# Patient Record
Sex: FEMALE | Race: BLACK OR AFRICAN AMERICAN | Employment: OTHER | URBAN - METROPOLITAN AREA
[De-identification: names, ages, dates, MRNs, and addresses within clinical notes are randomized per-mention and may not be internally consistent; named-entity substitution may affect disease eponyms.]

---

## 2024-05-29 ENCOUNTER — HOSPITAL ENCOUNTER (EMERGENCY)
Facility: HOSPITAL | Age: 24
Discharge: PSYCHIATRIC OR SUBSTANCE USE FACILITY | End: 2024-05-29
Attending: EMERGENCY MEDICINE
Payer: OTHER GOVERNMENT

## 2024-05-29 ENCOUNTER — HOSPITAL ENCOUNTER (INPATIENT)
Facility: HOSPITAL | Age: 24
LOS: 1 days | Discharge: HOME | End: 2024-05-30
Attending: PSYCHIATRY & NEUROLOGY | Admitting: PSYCHIATRY & NEUROLOGY
Payer: OTHER GOVERNMENT

## 2024-05-29 VITALS
HEART RATE: 89 BPM | WEIGHT: 145 LBS | TEMPERATURE: 98.3 F | DIASTOLIC BLOOD PRESSURE: 95 MMHG | OXYGEN SATURATION: 98 % | RESPIRATION RATE: 18 BRPM | BODY MASS INDEX: 21.48 KG/M2 | HEIGHT: 69 IN | SYSTOLIC BLOOD PRESSURE: 138 MMHG

## 2024-05-29 DIAGNOSIS — F32.9 CURRENT EPISODE OF MAJOR DEPRESSIVE DISORDER WITHOUT PRIOR EPISODE, UNSPECIFIED DEPRESSION EPISODE SEVERITY: Primary | ICD-10-CM

## 2024-05-29 DIAGNOSIS — R45.851 SUICIDAL IDEATION: ICD-10-CM

## 2024-05-29 LAB
ALBUMIN SERPL-MCNC: 3.5 G/DL (ref 3.5–5)
ALBUMIN/GLOB SERPL: 1 (ref 1.1–2.2)
ALP SERPL-CCNC: 46 U/L (ref 45–117)
ALT SERPL-CCNC: 21 U/L (ref 12–78)
AMPHET UR QL SCN: NEGATIVE
ANION GAP SERPL CALC-SCNC: 8 MMOL/L (ref 5–15)
APPEARANCE UR: CLEAR
AST SERPL W P-5'-P-CCNC: 14 U/L (ref 15–37)
BACTERIA URNS QL MICRO: ABNORMAL /HPF
BARBITURATES UR QL SCN: NEGATIVE
BASOPHILS # BLD: 0 K/UL (ref 0–0.1)
BASOPHILS NFR BLD: 0 % (ref 0–1)
BENZODIAZ UR QL: NEGATIVE
BILIRUB SERPL-MCNC: 1.7 MG/DL (ref 0.2–1)
BILIRUB UR QL: NEGATIVE
BUN SERPL-MCNC: 16 MG/DL (ref 6–20)
BUN/CREAT SERPL: 21 (ref 12–20)
BUPRENORPHINE UR QL: NEGATIVE
CA-I BLD-MCNC: 8.7 MG/DL (ref 8.5–10.1)
CANNABINOIDS UR QL SCN: NEGATIVE
CHLORIDE SERPL-SCNC: 105 MMOL/L (ref 97–108)
CO2 SERPL-SCNC: 29 MMOL/L (ref 21–32)
COCAINE UR QL SCN: NEGATIVE
COLOR UR: YELLOW
CREAT SERPL-MCNC: 0.77 MG/DL (ref 0.55–1.02)
DIFFERENTIAL METHOD BLD: ABNORMAL
EOSINOPHIL # BLD: 0.1 K/UL (ref 0–0.4)
EOSINOPHIL NFR BLD: 3 % (ref 0–7)
EPITH CASTS URNS QL MICRO: ABNORMAL /LPF
ERYTHROCYTE [DISTWIDTH] IN BLOOD BY AUTOMATED COUNT: 12.8 % (ref 11.5–14.5)
ETHANOL SERPL-MCNC: <10 MG/DL (ref 0–0.08)
GLOBULIN SER CALC-MCNC: 3.6 G/DL (ref 2–4)
GLUCOSE SERPL-MCNC: 91 MG/DL (ref 65–100)
GLUCOSE UR STRIP.AUTO-MCNC: NEGATIVE MG/DL
HCG UR QL: NEGATIVE
HCT VFR BLD AUTO: 33.1 % (ref 35–47)
HGB BLD-MCNC: 10.9 G/DL (ref 11.5–16)
HGB UR QL STRIP: ABNORMAL
IMM GRANULOCYTES # BLD AUTO: 0 K/UL (ref 0–0.04)
IMM GRANULOCYTES NFR BLD AUTO: 0 % (ref 0–0.5)
KETONES UR QL STRIP.AUTO: 50 MG/DL
LEUKOCYTE ESTERASE UR QL STRIP.AUTO: NEGATIVE
LYMPHOCYTES # BLD: 1.3 K/UL (ref 0.8–3.5)
LYMPHOCYTES NFR BLD: 25 % (ref 12–49)
Lab: NORMAL
MCH RBC QN AUTO: 31.1 PG (ref 26–34)
MCHC RBC AUTO-ENTMCNC: 32.9 G/DL (ref 30–36.5)
MCV RBC AUTO: 94.3 FL (ref 80–99)
METHADONE UR QL: NEGATIVE
METHAMPHET UR QL: NEGATIVE
MONOCYTES # BLD: 0.4 K/UL (ref 0–1)
MONOCYTES NFR BLD: 9 % (ref 5–13)
NEUTS SEG # BLD: 3.3 K/UL (ref 1.8–8)
NEUTS SEG NFR BLD: 63 % (ref 32–75)
NITRITE UR QL STRIP.AUTO: NEGATIVE
OPIATES UR QL: NEGATIVE
OXYCODONE UR QL SCN: NEGATIVE
PCP UR QL: NEGATIVE
PH UR STRIP: 6.5 (ref 5–8)
PLATELET # BLD AUTO: 200 K/UL (ref 150–400)
PMV BLD AUTO: 11.1 FL (ref 8.9–12.9)
POTASSIUM SERPL-SCNC: 3.4 MMOL/L (ref 3.5–5.1)
PROT SERPL-MCNC: 7.1 G/DL (ref 6.4–8.2)
PROT UR STRIP-MCNC: 30 MG/DL
RBC # BLD AUTO: 3.51 M/UL (ref 3.8–5.2)
RBC #/AREA URNS HPF: ABNORMAL /HPF (ref 0–5)
SODIUM SERPL-SCNC: 142 MMOL/L (ref 136–145)
SP GR UR REFRACTOMETRY: 1.02 (ref 1–1.03)
TRICYCLICS UR QL: NEGATIVE
UROBILINOGEN UR QL STRIP.AUTO: 4 EU/DL (ref 0.2–1)
WBC # BLD AUTO: 5.2 K/UL (ref 3.6–11)
WBC URNS QL MICRO: ABNORMAL /HPF (ref 0–4)

## 2024-05-29 PROCEDURE — 85025 COMPLETE CBC W/AUTO DIFF WBC: CPT

## 2024-05-29 PROCEDURE — 81001 URINALYSIS AUTO W/SCOPE: CPT

## 2024-05-29 PROCEDURE — 80307 DRUG TEST PRSMV CHEM ANLYZR: CPT

## 2024-05-29 PROCEDURE — 82077 ASSAY SPEC XCP UR&BREATH IA: CPT

## 2024-05-29 PROCEDURE — 1240000000 HC EMOTIONAL WELLNESS R&B

## 2024-05-29 PROCEDURE — 99283 EMERGENCY DEPT VISIT LOW MDM: CPT

## 2024-05-29 PROCEDURE — 80053 COMPREHEN METABOLIC PANEL: CPT

## 2024-05-29 PROCEDURE — 6370000000 HC RX 637 (ALT 250 FOR IP): Performed by: EMERGENCY MEDICINE

## 2024-05-29 PROCEDURE — 6370000000 HC RX 637 (ALT 250 FOR IP): Performed by: PSYCHIATRY & NEUROLOGY

## 2024-05-29 PROCEDURE — 81025 URINE PREGNANCY TEST: CPT

## 2024-05-29 RX ORDER — MAGNESIUM HYDROXIDE/ALUMINUM HYDROXICE/SIMETHICONE 120; 1200; 1200 MG/30ML; MG/30ML; MG/30ML
30 SUSPENSION ORAL EVERY 6 HOURS PRN
Status: DISCONTINUED | OUTPATIENT
Start: 2024-05-29 | End: 2024-05-30 | Stop reason: HOSPADM

## 2024-05-29 RX ORDER — TRAZODONE HYDROCHLORIDE 50 MG/1
50 TABLET ORAL NIGHTLY PRN
Status: DISCONTINUED | OUTPATIENT
Start: 2024-05-29 | End: 2024-05-30 | Stop reason: HOSPADM

## 2024-05-29 RX ORDER — ACETAMINOPHEN 325 MG/1
650 TABLET ORAL EVERY 4 HOURS PRN
Status: DISCONTINUED | OUTPATIENT
Start: 2024-05-29 | End: 2024-05-30 | Stop reason: HOSPADM

## 2024-05-29 RX ORDER — HYDROXYZINE 50 MG/1
50 TABLET, FILM COATED ORAL EVERY 6 HOURS PRN
Status: DISCONTINUED | OUTPATIENT
Start: 2024-05-29 | End: 2024-05-30 | Stop reason: HOSPADM

## 2024-05-29 RX ORDER — POTASSIUM CHLORIDE 20 MEQ/1
40 TABLET, EXTENDED RELEASE ORAL ONCE
Status: COMPLETED | OUTPATIENT
Start: 2024-05-29 | End: 2024-05-29

## 2024-05-29 RX ORDER — SERTRALINE HYDROCHLORIDE 25 MG/1
25 TABLET, FILM COATED ORAL DAILY
Status: DISCONTINUED | OUTPATIENT
Start: 2024-05-29 | End: 2024-05-30 | Stop reason: HOSPADM

## 2024-05-29 RX ADMIN — SERTRALINE HYDROCHLORIDE 25 MG: 25 TABLET ORAL at 11:55

## 2024-05-29 RX ADMIN — POTASSIUM CHLORIDE 40 MEQ: 1500 TABLET, EXTENDED RELEASE ORAL at 05:06

## 2024-05-29 ASSESSMENT — LIFESTYLE VARIABLES
HOW OFTEN DO YOU HAVE A DRINK CONTAINING ALCOHOL: MONTHLY OR LESS
HOW MANY STANDARD DRINKS CONTAINING ALCOHOL DO YOU HAVE ON A TYPICAL DAY: 1 OR 2
HOW MANY STANDARD DRINKS CONTAINING ALCOHOL DO YOU HAVE ON A TYPICAL DAY: 1 OR 2
HOW OFTEN DO YOU HAVE A DRINK CONTAINING ALCOHOL: MONTHLY OR LESS

## 2024-05-29 ASSESSMENT — PAIN SCALES - GENERAL: PAINLEVEL_OUTOF10: 0

## 2024-05-29 ASSESSMENT — PAIN - FUNCTIONAL ASSESSMENT: PAIN_FUNCTIONAL_ASSESSMENT: 0-10

## 2024-05-29 ASSESSMENT — SLEEP AND FATIGUE QUESTIONNAIRES
AVERAGE NUMBER OF SLEEP HOURS: 6
DO YOU HAVE DIFFICULTY SLEEPING: YES
DO YOU USE A SLEEP AID: NO
SLEEP PATTERN: DISTURBED/INTERRUPTED SLEEP

## 2024-05-29 NOTE — BSMART NOTE
Patient accepted by Dr. Penn at [facility]. Patient will go to bed 244-2 and report should be called to [phone]. Patient's nurse, Misti, notified.   
suicide attempts. Patient reports alcohol use about once a month and states that she last drank 3 days ago. Patient denies all other substance use.     Patient states that she is single with no children. [place] [facility]. Patient states that she is permanent party in the . She states that she has been at [facility] since September 2023.     At this time, patient would like to be voluntarily admitted to [facility].       This writer reviewed the [place] Suicide Severity Rating Scale in nursing flowsheet and the risk level assigned is low risk.  Based on this assessment, the risk of suicide is low risk and the plan is voluntary [facility] admission.  BSMART assessment completed, and suicide risk level noted to be low. NADINE Chappell notified. Concerns not observed.    The patient has demonstrated mental capacity to provide informed consent.  The information is given by the patient.  Previous Hospitalizations: None  The patient has not previously been in restraints.  Current Psychiatrist and/or  is none.    Lethality Assessment:  The potential for suicide noted by the following: noted by the following;  ideation.  The potential for homicide is not noted.  The patient has not been a perpetrator of sexual or physical abuse.  There are not pending charges.  The patient is not felt to be at risk for self harm or harm to others.       Section III - Psychosocial  The patient's overall mood and attitude is depressed/withdrawn.  Feelings of helplessness and hopelessness are not observed.  Generalized anxiety is not observed.  Panic is not observed. Phobias are not observed.  Obsessive compulsive tendencies are not observed.        Section IV - Mental Status Exam  The patient's appearance shows no evidence of impairment.  The patient's behavior shows no evidence of impairment. The patient is oriented to time, place, person and situation.  The patient's speech is soft.  The

## 2024-05-29 NOTE — GROUP NOTE
Group Therapy Note    Date: 5/29/2024    Group Start Time: 1410  Group End Time: 1440  Group Topic: Nursing    SSR 2 [facility] AC[facility]    Zeke Johnson RN    Discharge process    Group Therapy Note    Attendees: 2/8       Patient's Goal:      Notes:  Encouraged to attend did not    Status After Intervention:      Participation Level:     Participation Quality:       Speech:        Thought Process/Content:       Affective Functioning:       Mood:       Level of consciousness:        Response to Learning:       Endings:     Modes of Intervention:       Discipline Responsible: Registered Nurse      Signature:  Zeke Johnson RN

## 2024-05-29 NOTE — GROUP NOTE
Group Therapy Note    Date: 2024    Group Start Time: 930  Group End Time: 950  Group Topic: Nursing    SSR 2 [facility] ACUTE    Evelyn Cannon LPN        Group Therapy Note    Attendees: 1         Patient's Goal:  ***    Notes:  ***    Status After Intervention:  {Status After Intervention:718483159}    Participation Level: {Participation Level:068998204}    Participation Quality: {[facility] PARTICIPATION QUALITY:852223141}      Speech:  {[facility] CD_SPEECH:38028}      Thought Process/Content: {Thought Process/Content:482278439}      Affective Functioning: {Affective Functionin}      Mood: {Mood:242665904}      Level of consciousness:  {Level of consciousness:739187030}      Response to Learning: {[facility] Responses to Learnin}      Endings: {[facility] Endings:01293}    Modes of Intervention: {[facility] Modes of Intervention:031896525}      Discipline Responsible: {[facility] Multidisciplinary:839369887}      Signature:  Evelyn Cannon LPN

## 2024-05-29 NOTE — ED PROVIDER NOTES
comments: Patient is a 23-year-old female who presents with a chief complaint of mental health concerns.  Describes sensation of not wanting to be here which she says has been developing over the past few weeks.  She denies any sort of plan and denies any prior actual attempts.    Differential includes major depressive disorder, psychotic disorder, schizophrenia, bipolar disorder, emotional crisis, malingering, generalized anxiety disorder, schizoaffective disorder.    Will initiate BSMART consult.           ED Course:         ED Course as of 05/29/24 0623   Wed May 29, 2024   0408 Per BSMART, pt will be admitted to [facility]; will order medical screening labs   [REKHA]   0450 Hemoglobin Quant(!): 10.9 [REKHA]   0451 Ketones, Urine(!): 50 [REKHA]   0511 Lab workup shows normal renal function suspect likely dehydration.  Elevated BUN/creatinine ratio however I do not see indication at this time for intravenous fluids, patient able to drink.  Patient is medically clear for behavioral health assessment. [REKHA]      ED Course User Index  [REKHA] Mamadou Wahl DO     Sepsis Reassessment: Sepsis reassessment not applicable  ------------------------------------------------------------------------------------------------------------  SCREENINGS             No data recorded          Consultations:     CONSULTS:  IP CONSULT TO BSMART    See the ED course section or MDM, if applicable for details on the content of consultations requested.    Procedures and Critical Care       Performed by: Mamadou Wahl DO    Procedures             CRITICAL CARE NOTE :  6:23 AM  CRITICAL CARE TIME: Does not meet criteria for critical care time, 0 Minutes.    Mamadou Wahl DO      Disposition       Disposition: Admit to Psychiatry    Patient is medically cleared for admission and/or transfer to the psychiatry unit/psychiatric facility to complete their mental health treatment.      Diagnosis     Clinical Impression:   1. Current episode of major depressive

## 2024-05-29 NOTE — CONSULTS
Hospitalist Consultation Note    NAME:  Michelle Sweeney   :   2000   MRN:   [id number]     ATTENDING: Rayne Penn MD  PCP:  No primary care provider on file.    Date/Time:  2024 5:38 PM      Recommendations/Plan:       Active Problems:    * No active hospital problems. *  Resolved Problems:    * No resolved hospital problems. *  History of asthma        Code Status:   DVT Prophylaxis:           Subjective:   REQUESTING PHYSICIAN:  REASON FOR CONSULT:      Michelle is a 23 y.o.   female who I was asked to see for inpatient psychiatric evaluation and medical evaluation denies any chest pain no shortness of breath no cough no chills has a history of asthma denies any history of smoking            Past Medical History:   Diagnosis Date    Anxiety     Asthma       No past surgical history on file.  Social History     Tobacco Use    Smoking status: Never    Smokeless tobacco: Never   Substance Use Topics    Alcohol use: Yes      No family history on file.    No Known Allergies   Prior to Admission medications    Not on File       REVIEW OF SYSTEMS:     Total of 12 systems reviewed as follows:   I am not able to complete the review of systems because:   The patient is intubated and sedated    The patient has altered mental status due to his acute medical problems    The patient has baseline aphasia from prior stroke(s)    The patient has baseline dementia and is not reliable historian                 POSITIVE= underlined text  Negative = text not underlined  General:  fever, chills, sweats, generalized weakness, weight loss/gain,      loss of appetite   Eyes:    blurred vision, eye pain, loss of vision, double vision  ENT:    rhinorrhea, pharyngitis   Respiratory:   cough, sputum production, SOB, wheezing, SINGH, pleuritic pain   Cardiology:   chest pain, palpitations, orthopnea, PND, edema, syncope   Gastrointestinal:  abdominal pain , N/V, dysphagia, diarrhea, constipation, bleeding  answered       Critical Care Provided     Minutes non procedure based  ________________________________________________________________________  Signed: Ramakrishna Villalta MD      Procedures: see electronic medical records for all procedures/Xrays and details which were not copied into this note but were reviewed prior to creation of Plan.    LAB DATA REVIEWED:    Recent Results (from the past 24 hour(s))   Urine Drug Screen    Collection Time: 05/29/24  4:20 AM   Result Value Ref Range    Amphetamine, Urine Negative Negative      Barbiturates, Urine Negative Negative      Buprenorphine Urine Negative Negative      Benzodiazepines, Urine Negative Negative      Cocaine, Urine Negative Negative      Methadone, Urine Negative Negative      Methamphetamine, Urine Negative Negative      Opiates, Urine Negative Negative      Oxycodone Urine Negative Negative      Phencyclidine, Urine Negative Negative      THC, TH-Cannabinol, Urine Negative Negative      Tricyclic Antidepressants, Urine Negative Negative      Comments:       Urinalysis    Collection Time: 05/29/24  4:20 AM   Result Value Ref Range    Color, UA Yellow      Appearance Clear Clear      Specific Gravity, UA 1.020 1.003 - 1.030      pH, Urine 6.5 5.0 - 8.0      Protein, UA 30 (A) Negative mg/dL    Glucose, Ur Negative Negative mg/dL    Ketones, Urine 50 (A) Negative mg/dL    Bilirubin, Urine Negative Negative      Blood, Urine Small (A) Negative      Urobilinogen, Urine 4.0 (H) 0.2 - 1.0 EU/dL    Nitrite, Urine Negative Negative      Leukocyte Esterase, Urine Negative Negative     Ethanol    Collection Time: 05/29/24  4:20 AM   Result Value Ref Range    Ethanol Lvl <10 <10 mg/dL   Comprehensive Metabolic Panel    Collection Time: 05/29/24  4:20 AM   Result Value Ref Range    Sodium 142 136 - 145 mmol/L    Potassium 3.4 (L) 3.5 - 5.1 mmol/L    Chloride 105 97 - 108 mmol/L    CO2 29 21 - 32 mmol/L    Anion Gap 8 5 - 15 mmol/L    Glucose 91 65 - 100 mg/dL    BUN 16 6

## 2024-05-29 NOTE — BH NOTE
reaching out for help managing anxiety and depression    Family constellation: Single, no children    Is significant other involved? No    Describe support system: Command     Describe living arrangements and home environment: Pt lives alone in the 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

## 2024-05-29 NOTE — BH NOTE
ADMISSION NOTE:     Patient admitted voluntary under professional services of Dr. Penn with a diagnosis of Major Depressive Disorder. Patient arrived to the unit around 0650am per report and search was completed by night shift staff. It was reported by nightshift staff that patient is requesting to leave.     Upon entering patient room, patient was laying down and pleasant on approach, tearful. Patient was taken into the unit office and admission assessment was completed by this writer and patient was oriented to the unit and her surroundings. Patient stated that she is here due to \"feeling a lot of sadness beginning in May and she called the Behavioral Health with [place]  to schedule an appointment and the earliest appointment they could do for her was in July.\" Patient stated, \" sometimes I have trouble sleeping and the other night I woke up and couldn't go back to sleep and was in my head and in my thoughts and had a mental breakdown and drove self to hospital in [place].\" Patient included stressors such as \"work, family, and where I am currently in life.\" Patient stated that this is her first psychiatric admission and her first time ever having suicidal thoughts. Patient denies having a plan. At time of admission, patient denies feeling suicidal. Patient stated, \"I want medications, and need treatment, but I do not feel comfortable staying here.\" Patient was reassured that she would be seeing a Doctor this morning and also a  and she would have to discuss her feelings/concerns with the Doctor this morning. D19 process was explained to the patient. Patient cooperative during admission assessment with an overall flat affect and tearful at times. 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 Patient is currently living on the [facility].     Patient was oriented to the unit and her surroundings.       Fili was notified at 0815am for admission orders and left a voicemail. Fili returned call at 0854am and orders received for standard PRN's at this time. Patient request to leave was also discussed and Fili will see the patient this morning. Close observations ordered to ensure patient safety.

## 2024-05-29 NOTE — BH NOTE
DISCHARGE SUMMARY    NAME:Michelle Sweeney  : 2000  MRN: [id number]    The patient Michelle Sweeney exhibits the ability to control behavior in a less restrictive environment.  Patient's level of functioning is improving.  No assaultive/destructive behavior has been observed for the past 24 hours.  No suicidal/homicidal threat or behavior has been observed for the past 24 hours.  There is no evidence of serious medication side effects.  Patient has not been in physical or protective restraints for at least the past 24 hours.    If weapons involved, how are they secured? N/a    Is patient aware of and in agreement with discharge plan? Yes    Arrangements for medication:  Prescriptions to pharmacy in chart    Copy of discharge instructions to provider?:  Yes    Arrangements for transportation home:  Pt will make arrangements with Sgt Valle to  her car at the first hospital she went to in [place].     Keep all follow up appointments as scheduled, continue to take prescribed medications per physician instructions.  Mental health crisis number:  [phone]      Mental Health Emergency WARM LINE      [phone] ([phone])      M-F: 9am to 9pm      Sat & Sun: 5pm - 9pm  National suicide prevention lines:                             5-508-AVJTNXD (4-353-427-0088)       8-157-212-TALK ([phone])    Crisis Text Line:  Text HOME to 800935

## 2024-05-29 NOTE — ED TRIAGE NOTES
Pt states having a bad night and had a thought about harming herself. Pt denies a plan or beginning to do anything to harm herself that it was just a thought that crossed her mind. Pt denies, in the past month having any thoughts of harming herself or doing, beginning to do anything to end her life.

## 2024-05-29 NOTE — GROUP NOTE
Group Therapy Note    Date: 5/29/2024    Group Start Time: 1115  Group End Time: 1200  Group Topic: Process Group - Inpatient    SSR 2 [facility] ACUTE    Paloma Orozco MSW        Group Therapy Note: Facilitator encouraged the group to identify emotions and discussed coping strategies. Group members shared what they plan to do better for themselves. Most of them said, \"take my medication!\"    Attendees: 7       Patient's Goal:  To attend groups    Notes:  Pt was guarded but did respond quietly when prompted.    Status After Intervention:  Unchanged    Participation Level: Minimal    Participation Quality: Attentive      Speech:  hesitant      Thought Process/Content: Perseverating      Affective Functioning: Flat      Mood: depressed      Level of consciousness:  Alert, Oriented x4, and Attentive      Response to Learning: Able to verbalize current knowledge/experience, Able to verbalize/acknowledge new learning, Able to retain information, Capable of insight, Able to change behavior, and Progressing to goal      Endings: None Reported    Modes of Intervention: Education, Support, and Socialization      Discipline Responsible: /Counselor      Signature:  SANDRITA RAYMUNDO

## 2024-05-29 NOTE — BH NOTE
Pt was assessed by a member of the PHP staff and will begin the program 5/31 at 8:30. Writer spoke with [facility] to update her on the treatment/discharge plan and she said she will update Sgt Valle as well.

## 2024-05-29 NOTE — ED NOTES
TRANSFER - OUT REPORT:    Verbal report given to NADINE Melara on Michelle M [place]  being transferred to  for routine progression of patient care       Report consisted of patient's Situation, Background, Assessment and   Recommendations(SBAR).     Information from the following report(s) Nurse Handoff Report, ED Encounter Summary, and ED SBAR was reviewed with the receiving nurse.    [place] Fall Assessment:    Presents to emergency department  because of falls (Syncope, seizure, or loss of consciousness): No  Age > 70: No  Altered Mental Status, Intoxication with alcohol or substance confusion (Disorientation, impaired judgment, poor safety awaremess, or inability to follow instructions): No  Impaired Mobility: Ambulates or transfers with assistive devices or assistance; Unable to ambulate or transer.: No  Nursing Judgement: No          Lines:       Opportunity for questions and clarification was provided.      Patient transported with:  Tech

## 2024-05-29 NOTE — BH NOTE
Behavioral Health Transition Record to Provider    Patient Name: Michelle Sweeney  YOB: 2000  Medical Record Number: [id number]  Date of Admission: 5/29/2024  Date of Discharge: 5/30/2024    Attending Provider: Rayne Penn MD  Discharging Provider: Dr. Penn  To contact this individual call [phone] and ask the  to page.  If unavailable, ask to be transferred to Behavioral Health Provider on call.  A Behavioral Health Provider will be available on call 24/7 and during holidays.    Primary Care Provider: No primary care provider on file.    No Known Allergies    Reason for Admission: Seeking treatment    Admission Diagnosis: Depression [F32.A]    * No surgery found *    No results found for this visit on 05/29/24.    Immunizations administered during this encounter:   Immunization History   Administered Date(s) Administered    COVID-19, PFIZER PURPLE top, DILUTE for use, (age 12 y+), 30mcg/0.3mL 04/19/2021, 05/10/2021       Screening for Metabolic Disorders for Patients on Antipsychotic Medications  (Data obtained from the EMR)    Estimated Body Mass Index  Estimated body mass index is 21.4 kg/m² as calculated from the following:    Height as of this encounter: 1.753 m (5' 9.02\").    Weight as of this encounter: 65.8 kg (145 lb).     Vital Signs/Blood Pressure  BP (!) 126/95   Pulse 86   Temp 98.6 °F (37 °C) (Oral)   Resp 18   Ht 1.753 m (5' 9.02\")   Wt 65.8 kg (145 lb)   LMP 05/25/2024 (Exact Date)   BMI 21.40 kg/m²     Blood Glucose/Hemoglobin A1c  No results found for: \"GLU\", \"GLUCPOC\"    No results found for: \"HBA1C\"     Lipid Panel  No results found for: \"CHOL\", \"CHLST\", \"CHOLV\", \"406302\", \"HDL\", \"HDLC\", \"LDL\"     Discharge Diagnosis: Depression    Discharge Plan: Pt is accepted to the PHP program at [facility].    Discharge Medication List and Instructions:      Medication List        START taking these medications      hydrOXYzine HCl 50 MG tablet  Commonly known as: ATARAX  Take 1 tablet  by mouth 2 times daily as needed for Anxiety     sertraline 25 MG tablet  Commonly known as: ZOLOFT  Take 1 tablet by mouth daily  Start taking on: May 31, 2024     traZODone 50 MG tablet  Commonly known as: DESYREL  Take 1 tablet by mouth nightly as needed for Sleep               Where to Get Your Medications        These medications were sent to [facility]/pharmacy #[id number] - [place], [place] - [address] - P [phone] - F [phone]  [address] [address]      Phone: [phone]   hydrOXYzine HCl 50 MG tablet  sertraline 25 MG tablet  traZODone 50 MG tablet           To obtain results of studies pending at discharge, please contact [phone]    Follow-up Information       Follow up With Specialties Details Why Contact Info    [facility] PHP Program  Go on 5/31/2024 Instructions: Please attend your intake appointment for PHP at [facility] on 5/29 at 8:30 am. Check in with outpatient registration in the main hospital, and then go to the PHP office. You will stay until 1200pm and then report to [facility] at 1300 with Mr. Jay following your intake. [address] [place] [id number] [phone]    [facility]  Go on 5/31/2024 at 1300 with Mr. Chau for post discharge [place] [address]    [phone]            Advanced Directive:   Does the patient have an appointed surrogate decision maker? Not applicable  Does the patient have a Medical Advance Directive? Not applicable  Does the patient have a Psychiatric Advance Directive? Not applicable  If the patient does not have a surrogate or Medical Advance Directive AND Psychiatric Advance Directive, the patient was offered information on these advance directives Not applicable    Patient Instructions: Please continue all medications until otherwise directed by physician.      Tobacco Cessation Discharge Plan:   Is the patient a smoker and needs

## 2024-05-29 NOTE — BH NOTE
(DESYREL) tablet 50 mg  50 mg Oral Nightly PRN    hydrOXYzine HCl (ATARAX) tablet 50 mg  50 mg Oral Q6H PRN    aluminum & magnesium hydroxide-simethicone (MAALOX) 200-200-20 MG/5ML suspension 30 mL  30 mL Oral Q6H PRN    sertraline (ZOLOFT) tablet 25 mg  25 mg Oral Daily      SCHEDULED MEDICATIONS     sertraline  25 mg Oral Daily                 ASSESSMENT & PLAN        The patient, Michelle Sweeney, is a 23 y.o.  female who presents at this time for treatment of the following diagnoses:  Active Problems:    Major depressive disorder, moderate single episode with anxiety    We will start the patient on Zoloft 25 mg p.o. daily  Explore partial hospitalization program options for the patient  We will continue to follow this patient during her hospital course and address psychiatric need         Patient was seen this morning, provided support reviewed stressors leading to hospitalization.  Discussed medications and therapy.  Patient was discussed also regarding contact with the command and partial hospitalization program for further planning and stepdown.  Patient currently denies having any active suicidal thoughts plans or intent.    Patient states that she came to the emergency room to get help for her depression and suicidal feelings.  She does express that she did not feel safe on the day of her presentation to the emergency department.    Patient was given the opportunity to ask questions. Informed consent given to the use of the above medications.    Continue to adjust psychiatric and non-psychiatric medications as deemed appropriate & based upon diagnoses and response to treatment.     Reviewed admission labs    Gather additional collateral information from family and o/p treatment team to further elucidate the nature of patient's psychopathology and baselline level of psychiatric functioning.    Placed on close observation, for safety    Patient to engage in individual therapy, group therapy support group,  psychoeducational group, safety planning.      Patient continue to address stressors that led to hospitalization.    Strengths-patient able to express self, average intelligence    Weakness-impaired coping, limited primary support,     Discharge Criteria-  Patient is able to show progress and improvement in neurovegetative symptoms of depression, anxiety   patient is no longer actively suicidal or homicidal and has no command hallucinations.   Patient  is able to present with healthy ways to cope with current stressors.         Current Facility-Administered Medications:     acetaminophen (TYLENOL) tablet 650 mg, 650 mg, Oral, Q4H PRN, Rayne Penn MD    traZODone (DESYREL) tablet 50 mg, 50 mg, Oral, Nightly PRN, Rayne Penn MD    hydrOXYzine HCl (ATARAX) tablet 50 mg, 50 mg, Oral, Q6H PRN, Rayne Penn MD    aluminum & magnesium hydroxide-simethicone (MAALOX) 200-200-20 MG/5ML suspension 30 mL, 30 mL, Oral, Q6H PRN, Rayne Penn MD    sertraline (ZOLOFT) tablet 25 mg, 25 mg, Oral, Daily, Rayne Penn MD, 25 mg at 05/29/24 2839     ESTIMATED LENGTH OF STAY:   2 to 3 days and will stepdown to partial hospitalization program

## 2024-05-30 VITALS
DIASTOLIC BLOOD PRESSURE: 75 MMHG | SYSTOLIC BLOOD PRESSURE: 102 MMHG | HEIGHT: 69 IN | OXYGEN SATURATION: 98 % | HEART RATE: 68 BPM | RESPIRATION RATE: 16 BRPM | WEIGHT: 145 LBS | BODY MASS INDEX: 21.48 KG/M2 | TEMPERATURE: 98.4 F

## 2024-05-30 PROCEDURE — 6370000000 HC RX 637 (ALT 250 FOR IP): Performed by: PSYCHIATRY & NEUROLOGY

## 2024-05-30 RX ORDER — SERTRALINE HYDROCHLORIDE 25 MG/1
25 TABLET, FILM COATED ORAL DAILY
Qty: 15 TABLET | Refills: 0 | Status: SHIPPED | OUTPATIENT
Start: 2024-05-31

## 2024-05-30 RX ORDER — TRAZODONE HYDROCHLORIDE 50 MG/1
50 TABLET ORAL NIGHTLY PRN
Qty: 15 TABLET | Refills: 0 | Status: SHIPPED | OUTPATIENT
Start: 2024-05-30

## 2024-05-30 RX ORDER — HYDROXYZINE 50 MG/1
50 TABLET, FILM COATED ORAL 2 TIMES DAILY PRN
Qty: 15 TABLET | Refills: 0 | Status: SHIPPED | OUTPATIENT
Start: 2024-05-30 | End: 2024-06-09

## 2024-05-30 RX ADMIN — SERTRALINE HYDROCHLORIDE 25 MG: 25 TABLET ORAL at 09:20

## 2024-05-30 NOTE — PLAN OF CARE
Problem: Discharge Planning  Goal: Discharge to home or other facility with appropriate resources  5/29/2024 2039 by Nabila López LPN  Outcome: Progressing  5/29/2024 1014 by Carola Izaguirre RN  Outcome: Progressing     Problem: Risk for Elopement  Goal: Patient will not exit the unit/facility without proper excort  5/29/2024 2039 by Nabila López LPN  Outcome: Progressing  5/29/2024 1014 by Carola Izaguirre RN  Outcome: Progressing     Problem: Anxiety  Goal: Will report anxiety at manageable levels  Description: INTERVENTIONS:  1. Administer medication as ordered  2. Teach and rehearse alternative coping skills  3. Provide emotional support with 1:1 interaction with staff  5/29/2024 2039 by Nabila López LPN  Outcome: Progressing  5/29/2024 1014 by Carola Izaguirre RN  Outcome: Progressing     Problem: Coping  Goal: Pt/Family able to verbalize concerns and demonstrate effective coping strategies  Description: INTERVENTIONS:  1. Assist patient/family to identify coping skills, available support systems and cultural and spiritual values  2. Provide emotional support, including active listening and acknowledgement of concerns of patient and caregivers  3. Reduce environmental stimuli, as able  4. Instruct patient/family in relaxation techniques, as appropriate  5. Assess for spiritual pain/suffering and initiate Spiritual Care, Psychosocial Clinical Specialist consults as needed  5/29/2024 2039 by Nabila López LPN  Outcome: Progressing  5/29/2024 1014 by Carola Izaguirre RN  Outcome: Progressing     Problem: Decision Making  Goal: Pt/Family able to effectively weigh alternatives and participate in decision making related to treatment and care  Description: INTERVENTIONS:  1. Determine when there are differences between patient's view, family's view, and healthcare provider's view of condition  2. Facilitate patient and family articulation of goals for care  3. Help patient  and family identify pros/cons of alternative solutions  4. Provide information as requested by patient/family  5. Respect patient/family right to receive or not to receive information  6. Serve as a liaison between patient and family and health care team  7. Initiate Consults from Ethics, Palliative Care or initiate Family Care Conference as is appropriate  5/29/2024 2039 by Nabila López LPN  Outcome: Progressing  5/29/2024 1014 by Carola Izaguirre, RN  Outcome: Progressing     Problem: Behavior  Goal: Pt/Family maintain appropriate behavior and adhere to behavioral management agreement, if implemented  Description: INTERVENTIONS:  1. Assess patient/family's coping skills and  non-compliant behavior (including use of illegal substances)  2. Notify security of behavior or suspected illegal substances which indicate the need for search of the family and/or belongings  3. Encourage verbalization of thoughts and concerns in a socially appropriate manner  4. Utilize positive, consistent limit setting strategies supporting safety of patient, staff and others  5. Encourage participation in the decision making process about the behavioral management agreement  6. If a visitor's behavior poses a threat to safety call refer to organization policy.  7. Initiate consult with , Psychosocial CNS, Spiritual Care as appropriate  5/29/2024 2039 by Nabila López LPN  Outcome: Progressing  5/29/2024 1014 by Carola Izaguirre, RN  Outcome: Progressing

## 2024-05-30 NOTE — BH NOTE
Pt noted up on unit sitting in dayroom watching TV, presented with a flat affect, denies any SI/HI,AH/VH, pleasant , cooperative, rates depression 3/10 and denies any anxiety, pt remain on close observation for safety.

## 2024-05-30 NOTE — BH NOTE
NURSING DISCHARGE NOTE    Discharged back to [facility].  Affect restricted; pleasant. Denied having thoughts to self harm. Some eye to eye contact. Pt is scheduled to attend the PHP program, here @ [facility], starting tomorrow. Written follow up info given/explained; verbalized she understood. Took all personal belongings, and valuables. Picked up by her officer; escorted to the door, by writer.

## 2024-05-30 NOTE — PROGRESS NOTES
Hospitalist Progress Note  [facility]    Subjective:   Daily Progress Note: 2024 8:24 AM    No complaints    Current Facility-Administered Medications   Medication Dose Route Frequency    acetaminophen (TYLENOL) tablet 650 mg  650 mg Oral Q4H PRN    traZODone (DESYREL) tablet 50 mg  50 mg Oral Nightly PRN    hydrOXYzine HCl (ATARAX) tablet 50 mg  50 mg Oral Q6H PRN    aluminum & magnesium hydroxide-simethicone (MAALOX) 200-200-20 MG/5ML suspension 30 mL  30 mL Oral Q6H PRN    sertraline (ZOLOFT) tablet 25 mg  25 mg Oral Daily        Review of Systems  Constitutional: negative for fevers, chills, sweats, and fatigue  Eyes: negative for redness and icterus  Ears, nose, mouth, throat, and face: negative for ear drainage, nasal congestion, sore throat, and voice change  Respiratory: negative for cough, wheezing, or dyspnea on exertion  Cardiovascular: negative for chest pain, dyspnea, palpitations, lower extremity edema  Gastrointestinal: negative for nausea, vomiting, diarrhea, and abdominal pain  Genitourinary:negative for frequency, dysuria, and hematuria  Integument/breast: negative for skin lesion(s) and dryness  Hematologic/lymphatic: negative for easy bruising, bleeding, and lymphadenopathy  Musculoskeletal:negative for neck pain, back pain, and muscle weakness  Neurological: negative for headaches, dizziness, and weakness  Behavioral/Psych: negative for anxiety and depression  Allergic/Immunologic: negative for urticaria and angioedema        Objective:     /75   Pulse 68   Temp 98.4 °F (36.9 °C) (Oral)   Resp 16   Ht 1.753 m (5' 9.02\")   Wt 65.8 kg (145 lb)   LMP 2024 (Exact Date)   SpO2 98%   BMI 21.40 kg/m²         Temp (24hrs), Av.3 °F (36.8 °C), Min:98 °F (36.7 °C), Max:98.6 °F (37 °C)      No intake/output data recorded.  No intake/output data recorded.    PHYSICAL EXAM:  Skin: Extremities and face reveal no rashes.   HEENT: Sclerae anicteric.

## 2024-05-30 NOTE — GROUP NOTE
Group Therapy Note    Date: 5/30/2024    Group Start Time: 0904  Group End Time: 0930  Group Topic: Community Meeting    [facility]    Cintia Servin        Group Therapy Note    Attendees:        Patient's Goal:  Patient stated goal is to leave today and start out patient program.    Notes:      Status After Intervention:  Improved    Participation Level: Active Listener    Participation Quality: Appropriate and Attentive      Speech:  normal      Thought Process/Content: Logical      Affective Functioning: Congruent      Mood:  Patient stated mood is pretty good.      Level of consciousness:  Oriented x4      Response to Learning: Able to verbalize current knowledge/experience      Endings: None Reported    Modes of Intervention: Education      Discipline Responsible: Behavorial Health Tech      Signature:  Cintia Servin

## 2024-05-30 NOTE — GROUP NOTE
Group Therapy Note    Date: 5/30/2024    Group Start Time: 1100  Group End Time: 1130  Group Topic: Process Group - Inpatient    [facility]    Farhad Gong        Group Therapy Note: This writer facilitated a group where the emotion of anger was discussed along with it's triggers and positive coping skills. Each individual was also provided a handout on \"anger management skills\" and \"triggers.\" After having an open discussion a game was played using the \"anger ball\" to process the emotion further.     Attendees: 6       Patient's Goal:  to attend groups.     Notes:  Pt stated when she feels like she is being ignored and \"not heard\" she gets angry. Pt able to voice that praying and isolating herself helps her cope with anger.     Status After Intervention:  Improved    Participation Level: Active Listener and Interactive    Participation Quality: Appropriate, Attentive, Sharing, and Supportive      Speech:  normal      Thought Process/Content: Logical  Linear      Affective Functioning: Congruent      Mood: calm      Level of consciousness:  Alert, Oriented x4, and Attentive      Response to Learning: Able to verbalize current knowledge/experience, Able to verbalize/acknowledge new learning, Able to retain information, Capable of insight, Able to change behavior, and Progressing to goal      Endings: None Reported    Modes of Intervention: Education, Support, and Socialization      Discipline Responsible: /Counselor      Signature:  Farhad Gong

## 2024-05-30 NOTE — GROUP NOTE
Group Therapy Note    Date: 5/30/2024    Group Start Time: 0940  Group End Time: 1025  Group Topic: Education Group - Inpatient    [facility] 2  NON ACUTE    Shazia Whitley        Group Therapy Note    Facilitated group to focus on defining different types of defense mechanisms and being able to recognize examples of some defenses that was used to cope with stress and anxiety      Attendees: 7/9       Patient's Goal:  Attend group daily    Notes:  Receptive to information discussed and engaged. Was able to share personal example of a defense she used prior to admission or in the past     Status After Intervention:  Improved    Participation Level: Active Listener and Interactive    Participation Quality: Appropriate, Attentive, and Sharing      Speech:  normal      Thought Process/Content: Logical      Affective Functioning: Congruent      Mood:  Calm      Level of consciousness:  Attentive      Response to Learning: Able to verbalize current knowledge/experience and Progressing to goal      Endings: None Reported    Modes of Intervention: Education and Support      Discipline Responsible: Recreational Therapist      Signature:  MARIBEL Wyman

## 2024-05-30 NOTE — GROUP NOTE
Group Therapy Note    Date: 5/29/2024    Group Start Time: 1845  Group End Time: 1930  Group Topic: Recreational    [facility] 2 [facility] ACUTE    Shameka Stephenson        Group Therapy Note    Attendees: 7/7    Recreational Therapist facilitated structured leisure skills group to introduce healthy leisure skills as positive way to cope and manage mood.         Patient's Goal:  Attend groups daily    Notes:   Attended group. Listened to songs played during the group session. Pt was cooperative and receptive to intervention.     Status After Intervention:  Improved    Participation Level: Active Listener    Participation Quality: Appropriate      Speech:  normal      Thought Process/Content: Logical      Affective Functioning: Congruent      Mood:  calm      Level of consciousness:  Alert      Response to Learning: Progressing to goal      Endings: None Reported    Modes of Intervention: Activity      Discipline Responsible: Recreational Therapist      Signature:  MARIBEL Gupta

## 2024-05-31 ENCOUNTER — HOSPITAL ENCOUNTER (OUTPATIENT)
Facility: HOSPITAL | Age: 24
Discharge: HOME | End: 2024-06-03

## 2024-05-31 VITALS
OXYGEN SATURATION: 98 % | SYSTOLIC BLOOD PRESSURE: 106 MMHG | DIASTOLIC BLOOD PRESSURE: 79 MMHG | HEART RATE: 73 BPM | TEMPERATURE: 98.3 F

## 2024-05-31 ASSESSMENT — ANXIETY QUESTIONNAIRES
7. FEELING AFRAID AS IF SOMETHING AWFUL MIGHT HAPPEN: SEVERAL DAYS
6. BECOMING EASILY ANNOYED OR IRRITABLE: SEVERAL DAYS
GAD7 TOTAL SCORE: 11
2. NOT BEING ABLE TO STOP OR CONTROL WORRYING: MORE THAN HALF THE DAYS
5. BEING SO RESTLESS THAT IT IS HARD TO SIT STILL: SEVERAL DAYS
1. FEELING NERVOUS, ANXIOUS, OR ON EDGE: NEARLY EVERY DAY
IF YOU CHECKED OFF ANY PROBLEMS ON THIS QUESTIONNAIRE, HOW DIFFICULT HAVE THESE PROBLEMS MADE IT FOR YOU TO DO YOUR WORK, TAKE CARE OF THINGS AT HOME, OR GET ALONG WITH OTHER PEOPLE: SOMEWHAT DIFFICULT
4. TROUBLE RELAXING: SEVERAL DAYS
3. WORRYING TOO MUCH ABOUT DIFFERENT THINGS: MORE THAN HALF THE DAYS

## 2024-05-31 ASSESSMENT — PATIENT HEALTH QUESTIONNAIRE - PHQ9
9. THOUGHTS THAT YOU WOULD BE BETTER OFF DEAD, OR OF HURTING YOURSELF: SEVERAL DAYS
7. TROUBLE CONCENTRATING ON THINGS, SUCH AS READING THE NEWSPAPER OR WATCHING TELEVISION: NEARLY EVERY DAY
1. LITTLE INTEREST OR PLEASURE IN DOING THINGS: SEVERAL DAYS
10. IF YOU CHECKED OFF ANY PROBLEMS, HOW DIFFICULT HAVE THESE PROBLEMS MADE IT FOR YOU TO DO YOUR WORK, TAKE CARE OF THINGS AT HOME, OR GET ALONG WITH OTHER PEOPLE: SOMEWHAT DIFFICULT
8. MOVING OR SPEAKING SO SLOWLY THAT OTHER PEOPLE COULD HAVE NOTICED. OR THE OPPOSITE, BEING SO FIGETY OR RESTLESS THAT YOU HAVE BEEN MOVING AROUND A LOT MORE THAN USUAL: NEARLY EVERY DAY
6. FEELING BAD ABOUT YOURSELF - OR THAT YOU ARE A FAILURE OR HAVE LET YOURSELF OR YOUR FAMILY DOWN: MORE THAN HALF THE DAYS
SUM OF ALL RESPONSES TO PHQ9 QUESTIONS 1 & 2: 3
5. POOR APPETITE OR OVEREATING: SEVERAL DAYS
SUM OF ALL RESPONSES TO PHQ QUESTIONS 1-9: 15
SUM OF ALL RESPONSES TO PHQ QUESTIONS 1-9: 14
4. FEELING TIRED OR HAVING LITTLE ENERGY: SEVERAL DAYS
SUM OF ALL RESPONSES TO PHQ QUESTIONS 1-9: 15
2. FEELING DOWN, DEPRESSED OR HOPELESS: MORE THAN HALF THE DAYS
3. TROUBLE FALLING OR STAYING ASLEEP: SEVERAL DAYS
SUM OF ALL RESPONSES TO PHQ QUESTIONS 1-9: 15
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
## 2024-05-31 NOTE — SUICIDE SAFETY PLAN
SAFETY PLAN    A suicide Safety Plan is a document that supports someone when they are having thoughts of suicide.    Warning Signs that indicate a suicidal crisis may be developing: What (situations, thoughts, feelings, body sensations, behaviors, etc.) do you experience that lets you know you are beginning to think about suicide?  1. Crying (more so like bawling)  2. Isolation  3. Shows no emotions (flat)   4. Can become fidgety     Internal Coping Strategies:  What things can I do (relaxation techniques, hobbies, physical activities, etc.) to take my mind off my problems without contacting another person?  1. Being on Tiktok  2. Listening to music   3. Cleaning    People and social settings that provide distraction: Who can I call or where can I go to distract me?  1. Name: Candis BALDERAS Phone: [phone]  2. Name: Robert  Phone: [phone]   3. Place: Room in [facility]            4. Place: [facility]     People whom I can ask for help: Who can I call when I need help - for example, friends, family, clergy, someone else?  1. Name: Candis GUIDRYAvinash Phone: [phone]  2. Name: Robert  Phone: [phone]     Professionals or Behavioral Health agencies I can contact during a crisis: Who can I call for help - for example, my doctor, my psychiatrist, my psychologist, a mental health provider, a suicide hotline?  1. Clinician Name: Joseline ([facility] Clinician)   Phone: [phone]      Clinician Pager or Emergency Contact #: [phone]    2. Clinician Name: [facility] Crisis Line (Access)   Phone: [phone]       Clinician Pager or Emergency Contact #: [phone]    3. Suicide Prevention Lifeline: 6-685-436-TALK (1192)    4. Local Behavioral Health Emergency Services -  for example, [facility] Mental         Health Crisis Center, [facility] suicide hotline, [facility] Hotline: [address] Services Board       Emergency Services Address: [address] [place] [address]      Emergency Services Phone:  [phone]     Making

## 2024-06-03 ENCOUNTER — HOSPITAL ENCOUNTER (OUTPATIENT)
Facility: HOSPITAL | Age: 24
Discharge: HOME | End: 2024-06-06
Payer: OTHER GOVERNMENT

## 2024-06-03 VITALS
SYSTOLIC BLOOD PRESSURE: 111 MMHG | HEART RATE: 74 BPM | DIASTOLIC BLOOD PRESSURE: 82 MMHG | TEMPERATURE: 97.9 F | OXYGEN SATURATION: 99 %

## 2024-06-03 PROCEDURE — 90832 PSYTX W PT 30 MINUTES: CPT

## 2024-06-03 PROCEDURE — 90853 GROUP PSYCHOTHERAPY: CPT

## 2024-06-03 NOTE — GROUP NOTE
Group Therapy Note    Date: 6/3/2024    Group Start Time:  2:00 PM  Group End Time:  2:45 PM  Group Topic: Wrap-Up    [facility] Joseline Wiggins MSW        Group Therapy Note  This writer facilitated a wrap-up group by encouraging the patients to identify one goal they would like to accomplish this week. Each patient was asked to complete an outpatient behavioral health wrap-up sheet.    Attendees: 5       Patient's Goal: To identify a goal that can be accomplished in a week    Notes: PT was presented and participated in the group. Pt volunteered to provide insight on their answers and reflect on those answers. Pt identified that her goal of the week would be to give herself more kerri. Pt expressed that she can be hard on herself and wants to work on showing more self-compassion. Pt provided active listening and support to their peers as they shared their reflections based on their responses.     Status After Intervention:  Improved    Participation Level: Active Listener and Interactive    Participation Quality: Appropriate, Sharing, and Supportive      Speech:  normal      Thought Process/Content: Logical      Affective Functioning: Congruent      Mood: calm      Level of consciousness:  Alert and Oriented x4      Response to Learning: Able to verbalize current knowledge/experience, Able to verbalize/acknowledge new learning, Able to retain information, Able to change behavior, and Progressing to goal      Endings: None Reported    Modes of Intervention: Support, Socialization, and Exploration      Discipline Responsible: /Counselor      Signature:  SANDRITA Garcia

## 2024-06-03 NOTE — GROUP NOTE
Group Therapy Note    Date: 6/3/2024    Group Start Time:  9:40 AM  Group End Time: 10:30 AM  Group Topic: Process Group - Outpatient    [facility]    Alan Garcia MSW        Group Therapy Note    This writer facilitated group and encouraged patients to continue sharing and processing emotion check in sheets they completed in community meeting. Patients processed emotions, shared personal stories, and sought feedback from one another on how to best handle managing emotions.            Attendees: 4        Patient's goal: to process emotions, to engage with peers and provide support, to identify coping skills       Notes:  Pt was engaged in group and expressed feeling content. Pt also expressed fearing that she will be bored in groups but is glad she came in today. Patient was receptive to feedback offered by peers on ways they navigate staying grounded. Pt processed feeling nervous about addressing her trauma but was receptive to support. Pt was supportive towards peers and will continue to work towards goal.    Status After Intervention:  Improved    Participation Level: Active Listener    Participation Quality: Appropriate      Speech:  normal      Thought Process/Content: Logical      Affective Functioning: Congruent      Mood: anxious      Level of consciousness:  Alert      Response to Learning: Capable of insight and Progressing to goal      Endings: None Reported    Modes of Intervention: Support, Socialization, and Exploration      Discipline Responsible: /Counselor      Signature:  SANDRITA Gale
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
## 2024-06-03 NOTE — GROUP NOTE
Group Therapy Note    Date: 6/3/2024    Group Start Time: 12:00 PM  Group End Time:  1:00 PM  Group Topic: Psychotherapy    [facility]    Alan Garcia MSW        Group Therapy Note    Writer facilitated group and started with projecting a fallon talk on using humor as a coping skill. Patients actively watched and reflected on content. Patients engaged in a discussion around validating two emotions existing at the same time. Patients were provided with a list of activities they can do to incorporate humor into their lives such as sending funny postcards to friends. To conclude, patients were asked to select one activity they can commit to this week.        Attendees: 4      Patient's Goal: to learn about humor as a coping skill, to process emotions, to identify coping skills, to improve social well-being       Notes:  Pt was present the first fifteen minutes of group and watched the fallon talk. Pt did not participate in discussion due to being pulled for med management by Dr. Penn and individual session by Joseline Unger.     Status After Intervention:  Unchanged    Participation Level: Active Listener    Participation Quality: Appropriate      Speech:  normal      Thought Process/Content: Logical      Affective Functioning: Congruent      Mood: depressed      Level of consciousness:  Alert      Response to Learning: Progressing to goal      Endings: None Reported    Modes of Intervention: Education, Support, and Exploration      Discipline Responsible: /Counselor      Signature:  SANDRITA Gale

## 2024-06-03 NOTE — GROUP NOTE
Group Therapy Note    Date: 6/3/2024    Group Start Time:  9:00 AM  Group End Time:  9:40 AM  Group Topic: Community Meeting    [facility] Alan Mendoza MSW        Group Therapy Note    Writer facilitated group and started with assessing safety, mood, and goals via check in sheets. Patients were also requested to complete lunch menus. Patients were provided with an \" emotion check in\" sheet encouraging them to identify one emotion and reflect on why they feel that way this morning. Patients shared out loud and continued the processing into the next group.       Attendees: 5        Patient's Goal: to assess safety, to identify emotions, to identify goal       Notes:  Pt was engaged in group and denied SI/HI/SH on check in sheet. Patient identified goal of the day was to learn emotion regulation skills. Patient was unable to share her answer to what emotion she was feeling due to time but was encouraged to do so in the next group.     Status After Intervention:  Improved    Participation Level: Active Listener    Participation Quality: Appropriate      Speech:  normal      Thought Process/Content: Logical      Affective Functioning: Congruent      Mood: anxious      Level of consciousness:  Alert and Oriented x4      Response to Learning: Progressing to goal      Endings: None Reported    Modes of Intervention: Support, Socialization, and Exploration      Discipline Responsible: /Counselor      Signature:  SANDRITA Gale

## 2024-06-03 NOTE — GROUP NOTE
Group Therapy Note    Date: 6/3/2024    Group Start Time: 10:40 AM  Group End Time: 11:30 AM  Group Topic: Cognitive Skills    [facility]    Joseline Unger MSW        Group Therapy Note  This writer facilitated a cognitive skills group focusing on providing an overview of emotional regulation skills and the importance of learning these skills. Each patient was provided with an Emotional Regulation handout that included an overview of what it is, a model for describing emotions, how to observe and describe emotions, and a method on how to \"check the facts\" as it comes to understanding our emotions and reacting based on them. After allowing time for this, this facilitator encouraged patients to discuss their thoughts, feelings, and reflections following the activity.      Attendees: 4       Patient's Goal: To provide an overview on emotional regulation skills    Notes:PT was presented and participated in group. Pt volunteered to provide insight on their answers and reflect on those answers. Pt provided active listening and support to their peers as they shared their reflections based on their responses. Pt was observed by staff taking notes and asking questions through this group for further understanding. Pt expressed that she has found this skill super helpful for managing emotions. PT expressed that she would like to continue learning about emotional regulation and DBT skills.     Status After Intervention:  Improved    Participation Level: Active Listener and Interactive    Participation Quality: Appropriate, Sharing, and Supportive      Speech:  normal      Thought Process/Content: Logical      Affective Functioning: Congruent      Mood: calm      Level of consciousness:  Alert and Oriented x4      Response to Learning: Able to verbalize current knowledge/experience, Able to verbalize/acknowledge new learning, Able to retain information, Able to

## 2024-06-04 ENCOUNTER — HOSPITAL ENCOUNTER (OUTPATIENT)
Facility: HOSPITAL | Age: 24
Discharge: HOME | End: 2024-06-07
Payer: OTHER GOVERNMENT

## 2024-06-04 VITALS
SYSTOLIC BLOOD PRESSURE: 112 MMHG | OXYGEN SATURATION: 99 % | HEART RATE: 71 BPM | TEMPERATURE: 98.7 F | DIASTOLIC BLOOD PRESSURE: 78 MMHG

## 2024-06-04 PROCEDURE — 90853 GROUP PSYCHOTHERAPY: CPT

## 2024-06-04 NOTE — GROUP NOTE
Group Therapy Note    Date: 6/4/2024    Group Start Time:  9:40 AM  Group End Time: 10:30 AM  Group Topic: Process Group - Outpatient    [facility]    Alan Garcia MSW        Group Therapy Note    Writer started group by passing out katie, bud, and thorn sheets for patients to complete. Writer took patients out to the patio to complete exercise. After 15 minutes, patients returned and processed their thorn and buds. Patients supported one another by giving feedback on the thorns. To conclude, patients who did not get the chance to share were encouraged to do so in the next group.        Attendees: 7      Patient's Goal: to process emotions, to improve social well-being, to improve problem solving skills       Notes:  Pt was supportive towards peers in group. Pt encouraged peer struggling with grief to sit and feel uncomfortable emotions. Patient shared experience w/ grief when she lot her mom. Pt was seen completing exercise but did not get the time to share answers. Patient agreeable to sharing them in the next group.    Status After Intervention:  Improved    Participation Level: Active Listener    Participation Quality: Appropriate      Speech:  normal      Thought Process/Content: Logical      Affective Functioning: Congruent      Mood: anxious      Level of consciousness:  Alert and Oriented x4      Response to Learning: Capable of insight and Progressing to goal      Endings: None Reported    Modes of Intervention: Support, Socialization, and Exploration      Discipline Responsible: /Counselor      Signature:  SANDRITA Gale

## 2024-06-04 NOTE — GROUP NOTE
Group Therapy Note    Date: 6/4/2024    Group Start Time:  9:00 AM  Group End Time:  9:40 AM  Group Topic: Community Meeting    [facility]    Alan Garcia MSW        Group Therapy Note    Writer started group and requested patients to complete check in sheets along with lunch menus to assess safety, mood, and goals. Writer requested patients to write an intention of the day on the board after completing sheets. Patients shared intentions out loud with peers and reflected on support needed. To conclude, patients were asked to complete and share a \" Get to know you\" worksheet encouraging them to identify favorite music, books, motto, etc.       Attendees: 7        Patient's Goal: to identify goal, to assess safety, to improve social anxiety      Notes:  Pt was engaged in group and identified intention of the day was to avoid being closed off. Pt was receptive to feedback and support offered by peers. Pt denied SI/HI/SH on check in sheet and completed activity.    Status After Intervention:  Improved    Participation Level: Active Listener    Participation Quality: Appropriate      Speech:  normal      Thought Process/Content: Logical      Affective Functioning: Congruent      Mood: depressed      Level of consciousness:  Alert      Response to Learning: Capable of insight and Progressing to goal      Endings: None Reported    Modes of Intervention: Support, Socialization, and Exploration      Discipline Responsible: /Counselor      Signature:  SANDRITA Gale
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
## 2024-06-04 NOTE — GROUP NOTE
Group Therapy Note    Date: 6/4/2024    Group Start Time:  1:15 PM  Group End Time:  2:00 PM  Group Topic: Psychoeducation    [facility] Joseline Wiggins MSW        Group Therapy Note  This writer facilitated a psychoeducation group focusing on completing a behavior chain analysis with a real-life situation. Each patient was provided a behavior chain analysis handout with steps and prompts to follow along with. After allowing time for this, this facilitator encouraged patients to discuss their thoughts, feelings, and reflections following the activity.     Attendees: 7       Patient's Goal:  To practice using the behavior chain analysis in real time    Notes: PT was presented and participated in the group. Pt volunteered to provide insight on their answers and reflect on those answers. Pt provided active listening and support to their peers as they shared their reflections based on their responses. PT followed along with this writer as she provided an example on the board. Pt identified a situation she would like to use as an example for her behavioral chain analysis.     Status After Intervention:  Improved    Participation Level: Active Listener and Interactive    Participation Quality: Appropriate, Sharing, and Supportive      Speech:  normal      Thought Process/Content: Logical      Affective Functioning: Congruent      Mood: calm      Level of consciousness:  Alert and Oriented x4      Response to Learning: Able to verbalize current knowledge/experience, Able to verbalize/acknowledge new learning, Able to retain information, Able to change behavior, and Progressing to goal      Endings: None Reported    Modes of Intervention: Education, Support, Socialization, and Exploration      Discipline Responsible: /Counselor      Signature:  SANDRITA Garcia

## 2024-06-04 NOTE — GROUP NOTE
Group Therapy Note    Date: 6/4/2024    Group Start Time: 10:40 AM  Group End Time: 11:30 AM  Group Topic: Cognitive Skills    [facility]    Joseline Unger MSW        Group Therapy Note  This writer facilitated a cognitive skills group focusing on educating patients on how to use behavior chain analysis. Each participant was provided with a handout that provided education and an overview of how the skill can be used in real time. After allowing time for this, this facilitator encouraged patients to discuss their thoughts, feelings, and reflections following the activity.      Attendees: 6       Patient's Goal:  To provide education on how to analyze behavior using the behavior chain anaylsis    Notes: PT was presented and participated in the group. Pt volunteered to provide insight on their answers and reflect on those answers. Pt provided active listening and support to their peers as they shared their reflections based on their responses. Pt followed along with this writer as she read aloud the handout. Pt expressed that she found this skill helpful in identifying negative behaviors.     Status After Intervention:  Improved    Participation Level: Active Listener and Interactive    Participation Quality: Appropriate, Sharing, and Supportive      Speech:  normal      Thought Process/Content: Logical      Affective Functioning: Congruent      Mood: calm      Level of consciousness:  Alert and Oriented x4      Response to Learning: Able to verbalize current knowledge/experience, Able to verbalize/acknowledge new learning, Able to retain information, Able to change behavior, and Progressing to goal      Endings: None Reported    Modes of Intervention: Education, Support, Socialization, and Exploration      Discipline Responsible: /Counselor      Signature:  SANDRITA Garcia

## 2024-06-04 NOTE — GROUP NOTE
Group Therapy Note    Date: 6/4/2024    Group Start Time: 12:15 PM  Group End Time:  1:10 PM  Group Topic: Psychoeducation    Alan Flores MSW        Group Therapy Note    Writer started group with a discussion on the role emotions play on thoughts and behaviors. Patients were provided with distress tolerance sheets reviewing, \" What emotions do for you?\" And \" What makes it hard to regulate your emotions.\" Patients reviewed content and discussed how they related to the statements. Writer encouraged patients to complete worksheet asking them to challenge myths about their emotions. To conclude, writer went over the DBT TIP and STOP skill for emotion regulation.       Attendees: 7      Patient's Goal: to identify emotions, to learn distress tolerance skills, to challenge myths about emotions             Notes:  Pt engaged in group and identified struggling with not knowing how to regulate emotions and letting mood impact her ability to practice skills. Pt completed worksheet on myths and assisted peer with statements that were challenging. Pt was receptive to content and will continue to work towards goal.    Status After Intervention:  Improved    Participation Level: Active Listener    Participation Quality: Appropriate      Speech:  normal      Thought Process/Content: Logical      Affective Functioning: Congruent      Mood: anxious      Level of consciousness:  Alert and Oriented x4      Response to Learning: Able to verbalize/acknowledge new learning, Able to retain information, Capable of insight, and Progressing to goal      Endings: None Reported    Modes of Intervention: Education, Support, and Exploration      Discipline Responsible: /Counselor      Signature:  SANDRITA Gale
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
## 2024-06-05 ENCOUNTER — HOSPITAL ENCOUNTER (OUTPATIENT)
Facility: HOSPITAL | Age: 24
Discharge: HOME | End: 2024-06-08
Payer: OTHER GOVERNMENT

## 2024-06-05 VITALS
HEART RATE: 72 BPM | TEMPERATURE: 99 F | SYSTOLIC BLOOD PRESSURE: 112 MMHG | OXYGEN SATURATION: 99 % | DIASTOLIC BLOOD PRESSURE: 77 MMHG

## 2024-06-05 PROCEDURE — 90853 GROUP PSYCHOTHERAPY: CPT

## 2024-06-05 NOTE — GROUP NOTE
Group Therapy Note    Date: 6/5/2024    Group Start Time:  9:00 AM  Group End Time:  9:40 AM  Group Topic: Community Meeting    [facility] Alan Mendoza MSW        Group Therapy Note    Writer facilitated group and started with requesting patients to complete check in sheets to assess safety along with lunch menus. Patients were asked to jot an intention for the day on the board and share it with peers to help with accountability. To conclude, patients were provided with an art activity encouraging them to identify and draw four self-portraits to reflect on identity. Patients were asked to continue finishing activity and start reflection in the next group.        Attendees: 6      Patient's Goal: to assess safety, to identify goal, to reflect on identity         Notes:  Pt was alert in group and denied SI/HI/SH on check in sheet. Pt minimally engaged with peers but was actively listening to them. Pt identified intention of the day was to avoid zoning out in groups. Patient was seen starting activity and was encouraged to reflect on it in the next group.     Status After Intervention:  Improved    Participation Level: Active Listener    Participation Quality: Appropriate      Speech:  normal      Thought Process/Content: Logical      Affective Functioning: Congruent      Mood: anxious      Level of consciousness:  Alert and Oriented x4      Response to Learning: Capable of insight and Progressing to goal      Endings: None Reported    Modes of Intervention: Support, Socialization, and Exploration      Discipline Responsible: /Counselor      Signature:  SANDRITA Gale

## 2024-06-05 NOTE — BH NOTE
COLLATERAL CALL     This writer reached out to Mile -  at [facility] at 12:54 pm to gain insight about the pt's follow up appointment at [facility] next Friday. Mile did not answer the phone, this writer left a VM.

## 2024-06-05 NOTE — GROUP NOTE
Group Therapy Note    Date: 6/5/2024    Group Start Time: 10:40 AM  Group End Time: 11:30 AM  Group Topic: Cognitive Skills    [facility]    Joseline Unger MSW        Group Therapy Note  This writer facilitated a cognitive skills group focusing on educating and helping patients identify a coping skills list. Each participant was provided with a handout that provided education and an overview of how the skill can be used in real time. Each patient was encouraged to develop a coping skills list composed of internal and external coping skills. After allowing time for this, this facilitator encouraged patients to discuss their thoughts, feelings, and reflections following the activity.    Attendees: 8       Patient's Goal:  To provide education on identify and utilize coping skills    Notes: PT was presented and participated in the group. Pt volunteered to provide insight on their answers and reflect on those answers. Pt identified two coping strategies that she has found helpful, such as distracting herself and accessing her higher self. PT identifies that she likes to distract herself by using TikTok or praying. Pt identifies that one way she gains access to her higher self is by praying. Pt provided active listening and support to their peers as they shared their reflections based on their responses.     Status After Intervention:  Improved    Participation Level: Active Listener and Interactive    Participation Quality: Appropriate, Sharing, and Supportive      Speech:  normal      Thought Process/Content: Logical      Affective Functioning: Congruent      Mood: calm      Level of consciousness:  Alert and Oriented x4      Response to Learning: Able to verbalize current knowledge/experience, Able to verbalize/acknowledge new learning, Able to retain information, Able to change behavior, and Progressing to goal      Endings: None Reported    Modes

## 2024-06-05 NOTE — GROUP NOTE
Group Therapy Note    Date: 6/5/2024    Group Start Time:  9:40 AM  Group End Time: 10:30 AM  Group Topic: Process Group - Outpatient    [facility]    Alan Garcia MSW        Group Therapy Note    Writer started group by requesting patients to complete self portraits. Patients were paired off into groups of two and asked to interview one of peer's identity role. Patients were asked to actively listen to peers as they would share about them with the group at the end. Patients completed interviews and shared about the impact certain identity roles have on partner's mental health. To conclude, patients were asked to reflect on the purpose of the activity.      Attendees: 6        Patient's Goal: to process identity, to improve self-esteem, to engage with peers       Notes:  Pt was engaged in group and participated in activity. Pt shared about her role as sister and how it impacts her mental health in a good. She shared that after her mom passed they got closer and she found more fulfillment in the role. Patient actively listened to partner and shared reflections out loud with group.    Status After Intervention:  Improved    Participation Level: Active Listener    Participation Quality: Appropriate      Speech:  normal      Thought Process/Content: Logical      Affective Functioning: Congruent      Mood: anxious      Level of consciousness:  Alert and Oriented x4      Response to Learning: Capable of insight and Progressing to goal      Endings: None Reported    Modes of Intervention: Support, Socialization, and Exploration      Discipline Responsible: /Counselor      Signature:  SANDRITA Gale

## 2024-06-05 NOTE — GROUP NOTE
Group Therapy Note    Date: 6/5/2024    Group Start Time:  2:00 PM  Group End Time:  2:45 PM  Group Topic: Wrap-Up    [facility] Joseline Wiggins MSW        Group Therapy Note  This writer facilitated a wrap-up group by encouraging the patients to share what they put on their emotional collage boards. Each patient was asked to complete an outpatient behavioral health wrap-up sheet. After allowing time for this, this facilitator encouraged patients to discuss their thoughts, feelings, and reflections following the activity.       Attendees: 7       Patient's Goal: To reflect on emotions collage poster boards    Notes: PT was presented and participated in the group. Pt volunteered to provide insight on their answers and reflect on those answers. Pt presented an emotion collage to the group. Pt identified that one emotion she found challenging to find was silly. PT identified that it has brought insight into how she has changed from being a silly person to being uptight and would like to work on that. Pt provided active listening and support to their peers as they shared their reflections based on their responses.     Status After Intervention:  Improved    Participation Level: Active Listener and Interactive    Participation Quality: Appropriate, Sharing, and Supportive      Speech:  normal      Thought Process/Content: Logical      Affective Functioning: Congruent      Mood: calm      Level of consciousness:  Alert and Oriented x4      Response to Learning: Able to verbalize current knowledge/experience, Able to verbalize/acknowledge new learning, Able to retain information, Able to change behavior, and Progressing to goal      Endings: None Reported    Modes of Intervention: Education, Support, Socialization, and Exploration      Discipline Responsible: /Counselor      Signature:  SANDRITA Garcia

## 2024-06-05 NOTE — GROUP NOTE
Group Therapy Note    Date: 6/5/2024    Group Start Time:  1:10 PM  Group End Time:  2:00 PM  Group Topic: Psychoeducation    [facility]    Joseline Unger MSW        Group Therapy Note  This writer facilitated a psychoeducation group focusing on completing an emotions collage and finding magazine pictures that can symbolize various emotions, such as anger, sadness, love, excitement, silence, relaxation, loneliness, and embarrassment. Each patient was provided a handout and poster board to add their pictures to. After this, this writer facilitated a discussion regarding the activity for thoughts, feelings, and reflections following the activity.       Attendees: 6       Patient's Goal:   To identify emotions in others    Notes: PT was presented and participated in the group. Pt volunteered to provide insight on their answers and reflect on those answers. PT expressed that she found the activity fun and insightful. Pt expressed that she was able to find pictures for angry and excited but struggled to identify pictures for the rest. PT expressed that she decided to draw pictures that could truly show those emotions. Pt provided active listening and support to their peers as they shared their reflections based on their responses.     Status After Intervention:  Improved    Participation Level: Active Listener and Interactive    Participation Quality: Appropriate, Sharing, and Supportive      Speech:  normal      Thought Process/Content: Logical      Affective Functioning: Congruent      Mood: calm      Level of consciousness:  Alert and Oriented x4      Response to Learning: Able to verbalize current knowledge/experience, Able to verbalize/acknowledge new learning, Able to retain information, Able to change behavior, and Progressing to goal      Endings: None Reported    Modes of Intervention: Education, Support, and Socialization      Discipline

## 2024-06-05 NOTE — GROUP NOTE
Group Therapy Note    Date: 6/5/2024    Group Start Time: 12:00 PM  Group End Time:  1:00 PM  Group Topic: Psychoeducation    [facility] Alan Mendoza MSW        Group Therapy Note    Writer started group by projecting a video on the window of tolerance and handing out a sheet on various hyper/hypo arousal symptoms. Patients were asked to review symptoms and identify personal responses. Patients shared responses out loud and reflected on different situations that cause either a hypo or hyper arousal response. To conclude, patients were provided with a hand out asking them to identify situations that open their window vs close their window. Patients were asked to come on the board and jot two skills they can use to manage trauma responses.       Attendees: 6      Patient's Goal: to learn about trauma responses, to identify triggers, to identify coping skills       Notes:  Pt was engaged in group and receptive to content. Patient shared struggling with more hyperarousal symptoms such as anxiety and intrusive thoughts. Patient completed worksheet and identified seeking out social support to help expand window of tolerance.    Status After Intervention:  Improved    Participation Level: Active Listener    Participation Quality: Appropriate      Speech:  normal      Thought Process/Content: Logical      Affective Functioning: Congruent      Mood: anxious      Level of consciousness:  Alert and Oriented x4      Response to Learning: Able to verbalize/acknowledge new learning, Able to retain information, Capable of insight, and Progressing to goal      Endings: None Reported    Modes of Intervention: Education, Support, and Exploration      Discipline Responsible: /Counselor      Signature:  SANDRITA Gale

## 2024-06-06 ENCOUNTER — HOSPITAL ENCOUNTER (OUTPATIENT)
Facility: HOSPITAL | Age: 24
Discharge: HOME | End: 2024-06-09
Payer: OTHER GOVERNMENT

## 2024-06-06 VITALS
TEMPERATURE: 98.8 F | OXYGEN SATURATION: 97 % | SYSTOLIC BLOOD PRESSURE: 109 MMHG | DIASTOLIC BLOOD PRESSURE: 78 MMHG | HEART RATE: 83 BPM

## 2024-06-06 PROCEDURE — 90853 GROUP PSYCHOTHERAPY: CPT

## 2024-06-06 NOTE — GROUP NOTE
Group Therapy Note    Date: 6/6/2024    Group Start Time:  9:40 AM  Group End Time: 10:30 AM  Group Topic: Process Group - Outpatient    [facility]    Joseline Unger MSW        Group Therapy Note  This writer facilitated a process group by encouraging the patients to share any stressors that have recently been impacting them. This writer encouraged the patients to support one another and offer feedback to each other when appropriate. After allowing time for this, this writer used open-ended mental health process questions to prompt insight and socialization amongst the patients.        Attendees: 5       Patient's Goal:  Pt participated in the group. Pt volunteered to share their answers and listened to feedback from her peers. Pt provided active listening to her peer as her peer provided insight into her current stressor at the moment. Pt used questions to gain more insight about the hiccup that has occurred. Pt offered advice to her peer, stating that \"she should be honest with her friends and encourage them to talk about their issues with one another.\".     Status After Intervention:  Improved    Participation Level: Active Listener and Interactive    Participation Quality: Appropriate, Sharing, and Supportive      Speech:  normal      Thought Process/Content: Logical      Affective Functioning: Congruent      Mood: calm      Level of consciousness:  Alert and Oriented x4      Response to Learning: Able to verbalize current knowledge/experience, Able to verbalize/acknowledge new learning, Able to retain information, and Capable of insight      Endings: None Reported    Modes of Intervention: Support, Socialization, and Exploration      Discipline Responsible: /Counselor      Signature:  SANDRITA Garcia

## 2024-06-06 NOTE — BH NOTE
Patient approached writer at 10:20 and reported feeling nauseous. Writer explored if this was related to trauma processing or not. Pt stated it was not and requested to go home with intent to return tomorrow. Writer recommend pt seek urgent care if symptoms were to escalate. Pt was agreeable.  Pt stated feeling safe to drive home. Pt was oriented x4 with a congruent affect. Patient completed wrap up sheet and denied SI/HI.

## 2024-06-06 NOTE — GROUP NOTE
Group Therapy Note    Date: 6/6/2024    Group Start Time:  9:00 AM  Group End Time:  9:40 AM  Group Topic: Community Meeting    [facility] Alan Mendoza MSW        Group Therapy Note    Writer started group by requesting patients to complete check in sheets to assess safety and lunch menus. Patients were asked to identify an intention for the day and share it with peers. To conclude, patients jayshree and completed an emotion identification drawing activity and shared pictures with peers.      Attendees: 4          Patient's Goal: to assess safety, to identify emotions       Notes:  Pt denied SI/HI/SH on check in sheet. Patient identified goal of the day is to learn better coping skills. Patient completed activity and expressed having a lot of thoughts such as her trauma,god, and php circulating in her head.     Status After Intervention:  Improved    Participation Level: Active Listener    Participation Quality: Appropriate      Speech:  normal      Thought Process/Content: Logical      Affective Functioning: Congruent      Mood: anxious      Level of consciousness:  Alert and Oriented x4      Response to Learning: Capable of insight and Progressing to goal      Endings: None Reported    Modes of Intervention: Support, Socialization, and Exploration      Discipline Responsible: /Counselor      Signature:  SANDRITA Gale

## 2024-06-07 ENCOUNTER — HOSPITAL ENCOUNTER (OUTPATIENT)
Facility: HOSPITAL | Age: 24
Discharge: HOME | End: 2024-06-10
Payer: OTHER GOVERNMENT

## 2024-06-07 VITALS
TEMPERATURE: 99.1 F | HEART RATE: 89 BPM | SYSTOLIC BLOOD PRESSURE: 105 MMHG | OXYGEN SATURATION: 98 % | DIASTOLIC BLOOD PRESSURE: 79 MMHG

## 2024-06-07 PROCEDURE — 90853 GROUP PSYCHOTHERAPY: CPT

## 2024-06-07 NOTE — GROUP NOTE
Group Therapy Note    Date: 6/7/2024    Group Start Time:  9:00 AM  Group End Time:  9:40 AM  Group Topic: Community Meeting    [facility] Joseline Wiggins MSW        Group Therapy Note  This writer facilitated a community group using a brain dump to identify any current stressors or triggers. Each patient was handed a brain dump handout with various categories that they could follow along with. Each patient was given an outpatient behavioral health check-in sheet and a lunch menu to fill out. After allowing time for this, this writer facilitated a discussion to reflect on current stressors or triggers and provided interventions, if needed.    Attendees: 6       Patient's Goal:  To identify current stressors or triggers    Notes: PT was present and participated in the group. Pt provided active listening as their peers shared their responses to the brain dump questions. Pt volunteered to provide their responses based on the following prompts on the brain dump handout. Pt expressed that she is worried about losing important people in her life.    Status After Intervention:  Improved    Participation Level: Active Listener and Interactive    Participation Quality: Appropriate, Sharing, and Supportive      Speech:  normal      Thought Process/Content: Logical      Affective Functioning: Congruent      Mood: calm      Level of consciousness:  Alert and Oriented x4      Response to Learning: Able to verbalize current knowledge/experience, Able to verbalize/acknowledge new learning, Able to retain information, and Capable of insight      Endings: None Reported    Modes of Intervention: Support, Socialization, and Exploration      Discipline Responsible: /Counselor      Signature:  SANDRITA Garcia

## 2024-06-07 NOTE — GROUP NOTE
Group Therapy Note    Date: 6/7/2024    Group Start Time:  2:00 PM  Group End Time:  2:45 PM  Group Topic: Wrap-Up    [facility] Alan Mendoza MSW        Group Therapy Note    Writer facilitated group and started with requesting patients to complete wrap up sheets. Patients were provided with their safety plans and encouraged to review it in group. Patients were asked to give writer songs that motivate them to play in the background for group activity. Writer facilitated an affirmations activity and reflection to end the day.        Attendees: 6      Patient's Goal: to assess safety, to identify takeaways, to improve self-esteem       Notes:  Pt engaged in group and completed wrap up sheet. Pt denied SI/HI. Pt participated in activity and shared her favorite affirmation was that she has light inside of her. Pt was supportive towards peers and will continue to work towards goal.    Status After Intervention:  Improved    Participation Level: Active Listener    Participation Quality: Appropriate      Speech:  normal      Thought Process/Content: Logical      Affective Functioning: Congruent      Mood: anxious      Level of consciousness:  Alert and Oriented x4      Response to Learning: Capable of insight and Progressing to goal      Endings: None Reported    Modes of Intervention: Support, Socialization, and Exploration      Discipline Responsible: /Counselor      Signature:  SANDRITA Gale

## 2024-06-07 NOTE — GROUP NOTE
Group Therapy Note    Date: 6/7/2024    Group Start Time:  1:10 PM  Group End Time:  2:00 PM  Group Topic: Psychoeducation    [facility] Alan Mendoza MSW        Group Therapy Note    Writer facilitated group and started with projecting a fallon talk titled, \" The science behind motivation.\" Patients actively watched and engaged in a discussion around their personal struggles with motivation. To conclude, patients were asked to complete a \" GLADS\" activity and reflect on how compassion can help with motivation.          Attendees: 6      Patient's Goal: to improve self-esteem, to learn about motivation       Notes:  Pt actively listened to video as evidenced by nonverbal body cues. Pt minimally engaged with peers but was seen helping patient out with activity. Pt was pulled for med management for 15 mins by Dr. Penn.    Status After Intervention:  Unchanged    Participation Level: Active Listener    Participation Quality: Appropriate      Speech:  normal      Thought Process/Content: Logical      Affective Functioning: Congruent      Mood: anxious      Level of consciousness:  Alert and Oriented x4      Response to Learning: Progressing to goal      Endings: None Reported    Modes of Intervention: Education      Discipline Responsible: /Counselor      Signature:  SANDRITA Gale

## 2024-06-07 NOTE — BH NOTE
PATIENT UPDATE    This writer called Sherman Treadwell  , at 8:44 am regarding the patient's follow-up appointment at [place]. This writer informed Mile that [facility] staff are recommending the patient stay until Friday to complete the full two weeks of services. Mile was receptive to this information and indicated that she would look into seeing if that would be possible. This writer asked if there were any evening time slots for the pt to conduct her follow-up appointment, and Mile indicated that she was not sure at this time. Mile reported to this writer that she would take a look into it and give this writer a call back with updates.

## 2024-06-07 NOTE — GROUP NOTE
Group Therapy Note    Date: 6/7/2024    Group Start Time: 10:40 AM  Group End Time: 11:30 AM  Group Topic: Cognitive Skills    [facility]    Joseline Unger MSW        Group Therapy Note  This writer facilitated a cognitive skills group providing education on trauma and its impacts on the brain. Each patient was provided with a trauma handout, providing insight into what trauma is and the common reactions to it. Each patient was provided with a handout that provided education on the impacts of trauma on the brain. This facilitator showed the patients a video of \"flipping the lid\" and how trauma affects our brain, thoughts, feelings, and behaviors. Each patient was presented with a paper to identify how their brains have changed since going through traumatic events. After allowing time for this, this facilitator encourages patients to provide reflections, thoughts, or feelings on the information provided.     Attendees: 7       Patient's Goal:  To provide education on what is trauma and how it impacts the brain    Note: PT was presented and participated in group. Pt volunteered to provide insight on their answers and reflect on those answers. Pt provided active listening and support to their peers as they shared their reflections based on their responses. Pt expressed that since going through traumatic events, it has been hard to notice the changes in her or remember how she used to be.     Status After Intervention:  Improved    Participation Level: Active Listener and Interactive    Participation Quality: Appropriate, Sharing, and Supportive      Speech:  normal      Thought Process/Content: Logical      Affective Functioning: Congruent      Mood: calm      Level of consciousness:  Alert and Oriented x4      Response to Learning: Able to verbalize current knowledge/experience, Able to verbalize/acknowledge new learning, Able to retain information,

## 2024-06-07 NOTE — GROUP NOTE
Group Therapy Note    Date: 6/7/2024    Group Start Time:  9:40 AM  Group End Time: 10:30 AM  Group Topic: Process Group - Outpatient    [facility]    Alan Garcia MSW        Group Therapy Note    Writer facilitated process group and started by encouraging patients to identify situations they may want to process and receive feedback towards. Patients supported and related to peer struggling with sleep. To conclude, patients were requested to complete a social lily and energy level assessment.         Attendees: 5        Patient's Goal: to process emotions, to engage with peers       Notes:  Pt actively listened to peers and provided feedback on sleep routine. Pt encouraged peer to take melatonin before sleeping and a hot shower. Pt was seen completing assessments and will continue to work towards goal.    Status After Intervention:  Improved    Participation Level: Active Listener    Participation Quality: Appropriate      Speech:  normal      Thought Process/Content: Logical      Affective Functioning: Congruent      Mood: anxious      Level of consciousness:  Alert and Oriented x4      Response to Learning: Capable of insight and Progressing to goal      Endings: None Reported    Modes of Intervention: Support, Socialization, and Exploration      Discipline Responsible: /Counselor      Signature:  SANDRITA Gale

## 2024-06-07 NOTE — GROUP NOTE
Group Therapy Note    Date: 6/7/2024    Group Start Time: 12:00 PM  Group End Time:  1:00 PM  Group Topic: Psychoeducation    [facility]    Joseline Unger MSW        Group Therapy Note  This writer facilitated a treatment planning group focusing on coping skills. This writer Each patient was provided with two coping skills activities to complete. Patients were provided with options to make a coping skills recipe or create a poster. After allowing time for this, this facilitator encourages patients to provide reflections, thoughts, or feelings on the information provided.     Attendees: 7       Patient's Goal: To identify coping skills    Notes: PT was presented and participated in group. Pt volunteered to provide insight on their answers and reflect on those answers. Pt provided active listening and support to their peers as they shared their reflections based on their responses. Pt identified two coping skills that helps her are: praying and listening to a podcast.    Status After Intervention:  Improved    Participation Level: Active Listener and Interactive    Participation Quality: Appropriate, Sharing, and Supportive      Speech:  normal      Thought Process/Content: Logical      Affective Functioning: Congruent      Mood: calm      Level of consciousness:  Alert and Oriented x4      Response to Learning: Able to verbalize current knowledge/experience, Able to verbalize/acknowledge new learning, Able to retain information, and Capable of insight      Endings: None Reported    Modes of Intervention: Education, Support, and Socialization      Discipline Responsible: /Counselor      Signature:  SANDRITA Garcia

## 2024-06-08 NOTE — PROGRESS NOTES
Progress Note  Date:2024       Room:Room/bed info not found  Patient Name:Michelle Sweeney     YOB: 2000     Age:23 y.o.        Subjective    Subjective  Patient has been compliant with her medications.  She still struggles with her anxiety and insomnia.  She still presents depressed.  She states her appetite is still limited.  She states that she had to take an extra pill of trazodone to help her sleep.  She denies use of any substances or alcohol.  She wants to explore that she does not want to become dependent on the medication.    She does express that her energy is slightly better and that she has some energy that she reached out to her well wishes.  Denies any hallucinations.  Denies having any active SI  Review of Systems  Objective         Vitals Last 24 Hours:  TEMPERATURE:  Temp  Av.1 °F (37.3 °C)  Min: 99.1 °F (37.3 °C)  Max: 99.1 °F (37.3 °C)  RESPIRATIONS RANGE: No data recorded  PULSE OXIMETRY RANGE: SpO2  Av %  Min: 98 %  Max: 98 %  PULSE RANGE: Pulse  Av  Min: 89  Max: 89  BLOOD PRESSURE RANGE: Systolic (24hrs), Av , Min:105 , Max:105   ; Diastolic (24hrs), Av, Min:79, Max:79    I/O (24Hr):  No intake or output data in the 24 hours ending 24  Objective  Labs/Imaging/Diagnostics    Labs:  CBC:No results for input(s): \"WBC\", \"RBC\", \"HGB\", \"HCT\", \"MCV\", \"RDW\", \"PLT\" in the last 72 hours.  CHEMISTRIES:No results for input(s): \"NA\", \"K\", \"CL\", \"CO2\", \"BUN\", \"CREATININE\", \"GLUCOSE\", \"PHOS\", \"MG\" in the last 72 hours.    Invalid input(s): \"CA\"  PT/INR:No results for input(s): \"PROTIME\", \"INR\" in the last 72 hours.  APTT:No results for input(s): \"APTT\" in the last 72 hours.  LIVER PROFILE:No results for input(s): \"AST\", \"ALT\", \"BILIDIR\", \"BILITOT\", \"ALKPHOS\" in the last 72 hours.    Imaging Last 24 Hours:  No results found.  Assessment//Plan           Assessment & Plan  Prescriptions given for Zoloft 50 mg p.o. daily #30  Trazodone 50 mg at bedtime as needed

## 2024-06-10 NOTE — BH NOTE
COLLATERAL CALL       This writer reached out to Mile -  at [facility] at 10:52 am to gain insight about any updates regarding changing the time for the pt's follow up appt at [facility] this upcoming Friday. Mile did not answer the phone. This writer could not leave a VM.

## 2024-06-11 ENCOUNTER — HOSPITAL ENCOUNTER (OUTPATIENT)
Facility: HOSPITAL | Age: 24
Discharge: HOME | End: 2024-06-14
Payer: OTHER GOVERNMENT

## 2024-06-11 VITALS
HEART RATE: 72 BPM | TEMPERATURE: 98.5 F | OXYGEN SATURATION: 98 % | DIASTOLIC BLOOD PRESSURE: 75 MMHG | SYSTOLIC BLOOD PRESSURE: 116 MMHG

## 2024-06-11 PROCEDURE — 90853 GROUP PSYCHOTHERAPY: CPT

## 2024-06-11 PROCEDURE — 90832 PSYTX W PT 30 MINUTES: CPT

## 2024-06-11 NOTE — GROUP NOTE
Group Therapy Note    Date: 6/11/2024    Group Start Time:  1:10 PM  Group End Time:  2:00 PM  Group Topic: Psychoeducation    [facility] Joseline Wiggins MSW        Group Therapy Note  This writer facilitated a psychoeducation group focusing on identifying core values. Each patient recieved a handout with several different domains highlighting various core values such as: parenting, personal growth, leisure, and etc. Each patient was asked to rate from a scale of 0-10 how important this value is to them and rate on whether they are living their life according to the values they have set. After allowing time for this, this writer started a discussion and asked patients to reflect on the answers that were given and ask for advice and support from their peers if needed.     Attendees: 8       Patient's Goal: To provide an overview of cognitive defusion and identify potential strategies      Notes:  Pt participated in the group. Pt volunteered to share their answers and listened to feedback given by peers. Pt identified the core values she finds important, such as work, family, health, and spirituality. Pt identified two core values that she has been neglecting: social, family, and intimate relationships, health and spirituality. Pt provided active listening to her peers and provided support as they shared their answers.     Status After Intervention:  Improved    Participation Level: Active Listener and Interactive    Participation Quality: Appropriate, Sharing, and Supportive      Speech:  normal      Thought Process/Content: Logical      Affective Functioning: Congruent      Mood: calm      Level of consciousness:  Alert and Oriented x4      Response to Learning: Able to verbalize current knowledge/experience, Able to verbalize/acknowledge new learning, Able to retain information, Capable of insight, and Progressing to goal      Endings: None 
                                                                      Group Therapy Note    Date: 6/11/2024    Group Start Time:  2:00 PM  Group End Time:  2:45 PM  Group Topic: Wrap-Up    [facility] Alan Mendoza MSW        Group Therapy Note    The writer facilitated the group and started with requesting patients to complete wrap up sheets and identify one key takeaway. Patients were encouraged to share and reflect on them out loud.  To conclude, the writer facilitated an emotion charade game and processed it.         Attendees: 8         Patient's Goal: to assess safety, to identify emotions, to identify takeaways         Notes:  Pt identified takeaway was learning coping skills to manage depression. Pt denied SI/HI on wrap up sheet and participated in activity. Patient was supportive towards peers and will continue to work towards goal.    Status After Intervention:  Improved    Participation Level: Active Listener    Participation Quality: Appropriate      Speech:  normal      Thought Process/Content: Logical      Affective Functioning: Congruent      Mood: anxious      Level of consciousness:  Alert      Response to Learning: Able to verbalize/acknowledge new learning, Capable of insight, and Progressing to goal      Endings: None Reported    Modes of Intervention: Support, Socialization, and Exploration      Discipline Responsible: /Counselor      Signature:  SANDRITA Gale    
                                                                      Group Therapy Note    Date: 6/11/2024    Group Start Time:  9:40 AM  Group End Time: 10:30 AM  Group Topic: Process Group - Outpatient    [facility]    Alan Garcia MSW        Group Therapy Note    Writer facilitated group and started with providing a worksheet on \" Externalizing Depression and Rewriting the Story.\" Patients were asked to imagine depression and hope outside of themselves and draw it on different sides of the paper. Patients were asked to be mindful of colors, words, shape, and texture of the drawing. To conclude, writer informed patient that a follow up discussion on the drawings will be facilitated in another group.      Attendees: 6      Patient's Goal: to learn about depression, to process emotions         Notes:  Pt was present the first 15 minutes of group and was pulled for an individual by sandrita Garcia for the rest of group.    Status After Intervention:  Unchanged    Participation Level: Active Listener    Participation Quality: Appropriate      Speech:  normal      Thought Process/Content: Logical      Affective Functioning: Congruent      Mood: anxious      Level of consciousness:  Alert and Oriented x4      Response to Learning: Progressing to goal      Endings: None Reported    Modes of Intervention: Support, Socialization, and Exploration      Discipline Responsible: /Counselor      Signature:  SANDRITA Gale    
                                                                      Group Therapy Note    Date: 6/11/2024    Group Start Time: 10:40 AM  Group End Time: 11:30 AM  Group Topic: Psychotherapy    [facility]    Joseline Unger MSW        Group Therapy Note  This writer facilitated a cognitive skills group to provide education on cognitive defusion. Each patient received a handout that provided an overview of the skill as well as potential strategies that go along with it. After allowing time for this, this writer started a discussion and asked patients to reflect on the answers that were given and ask for advice and support from their peers if needed.     Attendees: 8       Patient's Goal: To provide an overview of cognitive defusion and identify potential strategies    Notes: Pt participated in the group. Pt volunteered to share their answers and listened to feedback given by peers. PT expressed that he found all of the skills necessary for managing intense thoughts. Pt identified various skills that he found helpful, such as naming her story and using a pop up mind for her to address and release her thoughts. Pt provided active listening to her peers and provided support as they shared their answers.     Status After Intervention:  Improved    Participation Level: Active Listener and Interactive    Participation Quality: Appropriate, Sharing, and Supportive      Speech:  normal      Thought Process/Content: Logical      Affective Functioning: Congruent      Mood: calm      Level of consciousness:  Alert and Oriented x4      Response to Learning: Able to verbalize current knowledge/experience, Able to verbalize/acknowledge new learning, Able to retain information, Capable of insight, and Progressing to goal      Endings: None Reported    Modes of Intervention: Education, Support, and Socialization      Discipline Responsible: /Counselor      Signature:  SANDRITA Garcia    
                                                                      Group Therapy Note    Date: 6/11/2024    Group Start Time: 12:00 PM  Group End Time:  1:00 PM  Group Topic: Psychotherapy    [facility] Alan Mendoza MSW        Group Therapy Note    Writer facilitated group and requested patients to pull out activity from group two. Patients were encouraged to share and reflect on their drawings representing depression and hope. Patients actively listened to each other and provided support. To conclude, patients were asked to write a letter from hope to depression. Patients completed activity and shared it out loud with peers.       Attendees: 8        Patient's Goal: to process emotions, to manage depression       Notes:  Pt participated in group and shared that her depression felt like a FPC. Pt jayshree various symptoms surrounding the drawing. Patient completed letter activity and will continue to work towards goal.    Status After Intervention:  Improved    Participation Level: Active Listener    Participation Quality: Appropriate      Speech:  normal      Thought Process/Content: Logical      Affective Functioning: Congruent      Mood: anxious      Level of consciousness:  Alert and Oriented x4      Response to Learning: Able to retain information, Capable of insight, and Progressing to goal      Endings: None Reported    Modes of Intervention: Support, Socialization, and Exploration      Discipline Responsible: /Counselor      Signature:  SANDRITA Gale    
Responsible: /Counselor      Signature:  SANDRITA Garcia

## 2024-06-11 NOTE — BH NOTE
COLLATERAL CALL       This writer reached out to Mile -  at [facility] at 8:28 am and 9:58 am to gain insight about any updates regarding changing the time for the pt's follow up appt at [place] this upcoming Friday. Mile did not answer the phone. This writer could not leave a VM.  
PARTIAL HOSPITALIZATION PROGRAM DISCHARGE SUMMARY    Michelle Acuña y.o.  2000  [id number]  [phone]        Admission Date: 5/31/24  Discharge Date: 6/13/24    Admission Diagnosis (DSM 5): F32.A, Depression, unspecified     Discharge Diagnosis (DSM 5): F33.1, Major depressive disorder, moderate recurrent  F40.10, Social Anxiety    Status at Last Contact: Discharge Session    Date and Time of Last Contact/Attempted Contact: 6/11/24 at 12:00 pm    Guardian Contacted: N/A    Reason for Admission (Summary): PT was admitted to PHP treatment following an inpatient stay at [facility] for sucidial ideation. The patient expressed that she wanted to come to PHP treatment because \"I want to learn how to control my anxiety and stop overthinking.\". The patient informed this writer that she has been experiencing increased levels of anxiety and depression. The patient informed this writer of various symptoms she was experiencing, such as suicidal ideation, fleeting thoughts, isolation, overthinking, withdrawal from social life or friends, numbness, insomnia, a lack of appetite, and feelings of hopelessness. The patient informed this writer that she has been experiencing some social anxiety as well. The pt informed this writer of various symptoms such as lack of social cues, lack of interaction with people, not liking going places alone, anxiety in social settings, not liking big crowds of people, and overthinking about her interactions with people. The patient reported several losses and traumatic events that have taken place in her life. The pt informed this writer that she has not been able to grieve the loss of her mother and address how to move on after the loss. The pt informed this writer that due to these increased mental health symptoms, she has been having trouble managing her daily life tasks. The patient reported several losses and traumatic events that have taken place in her life.       Reason for Discharge (check 
returns from leave. The patient informed this writer that she is open to returning to continue treatment. The pt informed this writer that she is concerned about whether her job would approve more time off since she has not been going to work while attending [facility]. This writer informed the pt that she has spoken to her  at [facility] [employer] ([facility]) and was informed by [employer] that she would be able to re-attend once she comes back to [employer]. The patient appeared to be receptive to this information and indicated that she would take more time to think about it. This writer and the patient reviewed her safety plan and did not make any changes to it. This writer asked the patient if she had any more questions or concerns that she would like to address. The PT informed this writer that she did not have anything at this time and would let her know as any questions popped up.

## 2024-06-12 ENCOUNTER — HOSPITAL ENCOUNTER (OUTPATIENT)
Facility: HOSPITAL | Age: 24
Discharge: HOME | End: 2024-06-15
Payer: OTHER GOVERNMENT

## 2024-06-12 VITALS
TEMPERATURE: 98.3 F | OXYGEN SATURATION: 97 % | HEART RATE: 89 BPM | SYSTOLIC BLOOD PRESSURE: 106 MMHG | DIASTOLIC BLOOD PRESSURE: 71 MMHG

## 2024-06-12 PROCEDURE — 90853 GROUP PSYCHOTHERAPY: CPT

## 2024-06-12 NOTE — GROUP NOTE
Group Therapy Note    Date: 6/12/2024    Group Start Time: 10:40 AM  Group End Time: 11:30 AM  Group Topic: Cognitive Skills    [facility] Alan Mendoza MSW        Group Therapy Note    Writer facilitated group and started with requesting patients to share their definitions of anxiety. Patients were provided with a worksheet asking them to identify specific values and strengths that underlie the situations they are anxious about. Patients completed worksheet and shared answers with the group.      Attendees: 6        Patient's Goal: to learn about anxiety, to identify strengths and values       Notes:  Pt minimally engaged with peers but was actively listening to them. Patient was receptive to information and was seen finishing the worksheet. Patient shared that anxiety about school demonstrated values of freedom and independence.     Status After Intervention:  Unchanged    Participation Level: Active Listener and Interactive    Participation Quality: Appropriate      Speech:  normal      Thought Process/Content: Logical      Affective Functioning: Congruent      Mood: anxious      Level of consciousness:  Alert      Response to Learning: Progressing to goal      Endings: None Reported    Modes of Intervention: Education, Support, and Exploration      Discipline Responsible: /Counselor      Signature:  SANDRITA Gale

## 2024-06-12 NOTE — GROUP NOTE
Group Therapy Note    Date: 6/12/2024    Group Start Time:  9:40 AM  Group End Time: 10:30 AM  Group Topic: Process Group - Outpatient    [facility]    Joseline Unger MSW        Group Therapy Note  This writer facilitated a process group by encouraging the patients to share any stressors that have recently been impacting them. This writer encouraged the patients to support one another and offer feedback to each other when appropriate. After allowing time for this, this writer used open-ended mental health process questions to prompt insight and socialization amongst the patients.     Attendees: 6       Patient's Goal: To identify current stressors and provide support     Notes: PT was present and participated in the PHP group. When asked if he had anything to share for the process group, the PT responded that she did not have anything to discuss. PT provided support and suggestions to her peer as she discussed and processed her feelings around her current living situation. PT provided insight about her struggles with her fiances and suggested for her peer to look into creating a budget to reduce overspending. PT provided active listening for peers as they shared their responses to the question.     Status After Intervention:  Improved    Participation Level: Active Listener and Interactive    Participation Quality: Appropriate, Sharing, and Supportive      Speech:  normal      Thought Process/Content: Logical      Affective Functioning: Congruent      Mood: calm      Level of consciousness:  Alert and Oriented x4      Response to Learning: Able to verbalize current knowledge/experience, Able to verbalize/acknowledge new learning, Able to retain information, Capable of insight, and Progressing to goal      Endings: None Reported    Modes of Intervention: Support, Socialization, and Exploration      Discipline Responsible: Social

## 2024-06-12 NOTE — GROUP NOTE
Group Therapy Note    Date: 6/12/2024    Group Start Time: 12:00 PM  Group End Time:  1:00 PM  Group Topic: Psychotherapy    [facility]    Joseline Unger MSW        Group Therapy Note  This writer facilitated a psychotherapy group focusing on identifying social identities. This writer provided each patient with a social identity wheel and examples of what could be placed in each section. This writer also presented each patient with an overview of what social identities are and how they can impact how we interact with others in our day-to-day lives. After allowing time for this, this facilitator encourages patients to provide reflections.     Attendees: 7       Patient's Goal: To identify social idenities    Notes:  PT was present and participated in the PHP group. Pt volunteered to share their response and listened to the support given to them by their peers. PT identified the social idenities that she finds herself thinking about the most: Advent, socioeconomic status, and age. PT identified the social identities that she finds himself thinking about the least: national origins. PT provided active listening for peers as they shared their responses to the question.     Status After Intervention:  Improved    Participation Level: Active Listener and Interactive    Participation Quality: Appropriate, Sharing, and Supportive      Speech:  normal      Thought Process/Content: Logical      Affective Functioning: Congruent      Mood: calm      Level of consciousness:  Alert and Oriented x4      Response to Learning: Able to verbalize current knowledge/experience, Able to verbalize/acknowledge new learning, Able to retain information, Capable of insight, and Progressing to goal      Endings: None Reported    Modes of Intervention: Education, Support, and Socialization      Discipline Responsible: /Counselor      Signature:  Joseline Unger

## 2024-06-12 NOTE — GROUP NOTE
Group Therapy Note    Date: 6/12/2024    Group Start Time:  9:00 AM  Group End Time:  9:40 AM  Group Topic: Community Meeting    [facility] Alan Mendoza MSW        Group Therapy Note    Writer facilitated group and started with requesting patients to complete check in sheets to assess mood and safety. Patients were asked to identify a goal for the day and share it with one peer to help hold them accountable. To conclude, patients were asked to complete a crystal ball activity encouraging them to identify what they want to see in their future. Patients finished activity and shared it with the group.        Attendees: 7        Patient's Goal: to assess safety, to identify goals      Notes:  Pt was engaged in group and identified goal of the day is to feel better. Patient completed check in sheet and denied SI/HI. Patient shared that her crystal ball contained living in a house with siblings, finishing training, and etc.    Status After Intervention:  Improved    Participation Level: Active Listener    Participation Quality: Appropriate      Speech:  normal      Thought Process/Content: Logical      Affective Functioning: Congruent      Mood: anxious      Level of consciousness:  Alert and Oriented x4      Response to Learning: Progressing to goal      Endings: None Reported    Modes of Intervention: Support, Socialization, and Exploration      Discipline Responsible: /Counselor      Signature:  SANDRITA Gale

## 2024-06-12 NOTE — GROUP NOTE
Group Therapy Note    Date: 6/12/2024    Group Start Time:  1:10 PM  Group End Time:  2:00 PM  Group Topic: Relapse Prevention    [facility] Alan Mendoza MSW        Group Therapy Note    Writer shadowed peer , Praveen's group on relapse prevention. Praveen started with an ice breaker and requested patients to jot one fear on a piece of paper. Patients were asked to exchange their fears, read, and give each other advice. To conclude, Praveen reflected on the influence fear has on preventing a relapse.       Attendees: 5      Patient's Goal: to learn about relapse prevention skills       Notes:  Pt was present the first five minutes of group. Pt was absent for majority of it due to being pulled by Joseline Unger and leaving early for an appointment.     Status After Intervention:  Unchanged    Participation Level: Active Listener    Participation Quality: Appropriate      Speech:  normal      Thought Process/Content: Logical      Affective Functioning: Congruent      Mood: anxious      Level of consciousness:  Alert and Oriented x4      Response to Learning: Progressing to goal      Endings: None Reported    Modes of Intervention: Support, Socialization, and Exploration      Discipline Responsible: /Counselor      Signature:  SANDRITA Gale

## 2024-06-12 NOTE — BH NOTE
PATIENT UPDATE    This writer pulled PT to review discharge instructions and the safety plan ahead of discharge. This writer provided the patient with the discharge instructions, informing her of her follow-up appointment at [facility] tomorrow at 1 p.m. This writer went over the patient's safety plan and asked if there were any changes to be made. The PT informed this writer that she did not need to make any changes and felt her safety plan was accurate. This writer used tracking questions to gain insight into how the patient feels about returning to work after PHP. PT informed this writer that she was feeling a bit anxious about returning to work. Pt informed this writer that she is anxious about her co-workers asking questions about where she has been and what is going on. This writer informed the patient to inform her co-workers that she had a personal emergency that she had to take care of during her absence. This writer inquired if the patient would like to come back to PHP once she returns from leave. PT informed this writer that she would like to return but wants to make sure it does not impact the status of her job. This writer informed her that she spoke to [facility], and Mile indicated that if she would like to come back, there would just have to be another referral put into place. Pt appeared receptive to this information and indicated to this writer that she would like to take some time to think about it. This writer used tracking questions to gain insight on if she missed anything or if he had anything of importance to tell her or discuss. The PT reported that she did not have anything at this time that she could think of but would report back to this writer if anything popped up.

## 2024-06-13 ENCOUNTER — HOSPITAL ENCOUNTER (OUTPATIENT)
Facility: HOSPITAL | Age: 24
Discharge: HOME | End: 2024-06-16
Payer: OTHER GOVERNMENT

## 2024-06-13 VITALS
OXYGEN SATURATION: 99 % | HEART RATE: 71 BPM | TEMPERATURE: 98.4 F | DIASTOLIC BLOOD PRESSURE: 74 MMHG | SYSTOLIC BLOOD PRESSURE: 106 MMHG

## 2024-06-13 PROCEDURE — 90853 GROUP PSYCHOTHERAPY: CPT

## 2024-06-13 NOTE — GROUP NOTE
Group Therapy Note    Date: 6/13/2024    Group Start Time:  9:00 AM  Group End Time:  9:40 AM  Group Topic: Community Meeting    [facility]    Joseline Unger MSW        Group Therapy Note  This writer facilitated a community group encouraging patients to set intentions for the day. This writer encouraged each patient to write their intention on the board as a reminder of what they would like to accomplish today. This facilitator provided each patient with an outpatient check-in sheet and a lunch menu to complete. After allowing time for this, this facilitator encouraged patients to discuss their thoughts, feelings, and reflections following the activity.     Attendees: 7       Patient's Goal: To set intentions for the day    Notes:  Pt was present and participated in the PHP group. Pt volunteered to share their response and listened to the support given to them by their peers. Pt identified what her intention of the day would be. Pt identified her intention of the day would be to pay more attention during groups. PT provided active listening for peers as they shared their responses to the question.    Status After Intervention:  Improved    Participation Level: Active Listener and Interactive    Participation Quality: Appropriate, Sharing, and Supportive      Speech:  normal      Thought Process/Content: Logical      Affective Functioning: Congruent      Mood: calm      Level of consciousness:  Alert and Oriented x4      Response to Learning: Able to verbalize current knowledge/experience, Able to verbalize/acknowledge new learning, Able to retain information, Capable of insight, and Progressing to goal      Endings: None Reported    Modes of Intervention: Support, Socialization, and Exploration      Discipline Responsible: /Counselor      Signature:  SANDRITA Garcia

## 2024-06-13 NOTE — GROUP NOTE
Group Therapy Note    Date: 6/13/2024    Group Start Time:  1:10 PM  Group End Time:  2:00 PM  Group Topic: Psychotherapy    [facility]    Alan Garcia MSW        Group Therapy Note    Writer started group by finishing discussion from previous group on splitting. Writer transition to discussing the difference between guilt vs shame and the impact it has on mental health. Patients were encouraged to reflect on their definitions and then were provided with a handout outlining the definitions and different examples. Patients were asked to read out loud sentences that demonstrated shame and reframe them as a group. To conclude, patients were asked to read suggestions towards managing guilt and selecting one tip to practice this week.      Attendees: 7      Patient's Goal: to process emotions, to learn about shame and guilt      Notes:  Pt minimally engaged with peers but was actively listening to them. Patient shared struggling with shame but did not go into details about it. Patient was seen completing the exercise and will continue to be encouraged towards goal.    Status After Intervention:  Unchanged    Participation Level: Active Listener    Participation Quality: Appropriate      Speech:  normal      Thought Process/Content: Logical      Affective Functioning: Congruent      Mood: anxious      Level of consciousness:  Alert      Response to Learning: Progressing to goal      Endings: None Reported    Modes of Intervention: Education and Support      Discipline Responsible: /Counselor      Signature:  SANDRITA Gale

## 2024-06-13 NOTE — GROUP NOTE
Group Therapy Note    Date: 6/13/2024    Group Start Time:  2:00 PM  Group End Time:  2:45 PM  Group Topic: Wrap-Up    [facility]    Alan Garcia MSW        Group Therapy Note    Writer facilitated group and started with wrap up sheets to assess safety. Patients were asked to identify one takeaway from groups today and share it with peers. To conclude, writer provided patients with a self care assessment and encouraged them to share their plan to engage in one activity tonight.        Attendees: 7        Patient's Goals: to assess safety, to identify key takeaways, to identify self care activities      Notes:  Pt was present in group and reflected on her time at [facility]. Pt shared gaining a lot of insight about herself and different coping skills. Pt expressed feeling like her tx isn't complete so she will return once she comes back from leave. Patient completed wrap up sheet and denied SI/HI.     Status After Intervention:  Improved    Participation Level: Active Listener    Participation Quality: Appropriate      Speech:  normal      Thought Process/Content: Logical      Affective Functioning: Congruent      Mood: anxious      Level of consciousness:  Alert and Oriented x4      Response to Learning: Able to verbalize/acknowledge new learning and Progressing to goal      Endings: None Reported    Modes of Intervention: Support, Socialization, and Exploration      Discipline Responsible: /Counselor      Signature:  SANDRITA Gale

## 2024-06-13 NOTE — GROUP NOTE
Group Therapy Note    Date: 6/13/2024    Group Start Time: 10:40 AM  Group End Time: 11:30 AM  Group Topic: Cognitive Skills    [facility] Alan Mendoza MSW        Group Therapy Note    Writer started group with a discussion on \" splitting.\" Patients were provided with a handout reviewing definition, examples, and questions. Patients had a discussion on what parts of them newby with each other the most. Patients identified parts such as \" wanting to get treatment vs not wanting to get treatment\" and \" anger vs compassion.\" Patients reflected on the roles each part plays in impacting their mental health. To conclude, patients were asked to act out a dialogue between the two parts of them.       Attendees: 8      Patient's Goal: to learn about trauma, to identify coping skills, to process emotions, to learn about splitting       Notes:  Pt engaged in group and identified loneliness vs connection as two different parts of self that compete. Patient was receptive to feedback and content. Patient supported peers and will continue to work towards goal.    Status After Intervention:  Improved    Participation Level: Active Listener    Participation Quality: Appropriate      Speech:  normal      Thought Process/Content: Logical      Affective Functioning: Congruent      Mood: anxious      Level of consciousness:  Alert      Response to Learning: Able to verbalize/acknowledge new learning, Able to retain information, Capable of insight, and Progressing to goal      Endings: None Reported    Modes of Intervention: Education and Support      Discipline Responsible: /Counselor      Signature:  SANDRITA Gale

## 2024-06-13 NOTE — GROUP NOTE
Group Therapy Note    Date: 6/13/2024    Group Start Time:  9:40 AM  Group End Time: 10:30 AM  Group Topic: Process Group - Outpatient    [facility]    Joseline Unger MSW        Group Therapy Note  This writer facilitated a process group by encouraging the patients to share any stressors that have recently been impacting them. This writer encouraged the patients to support one another and offer feedback to each other when appropriate. After allowing time for this, this writer used open-ended mental health process questions to prompt insight and socialization amongst the patients.       Attendees: 8       Patient's Goal:  To identify current stressors and provide support     Notes: Pt was present and participated in the PHP group. When asked if she had anything to process, the pt informed this writer that she did not have anything to discuss. PT provided active listening for peers as they shared their responses to the question. Pt provided verbal support to her peers and offered various interventions.    Status After Intervention:  Improved    Participation Level: Active Listener and Interactive    Participation Quality: Appropriate, Sharing, and Supportive      Speech:  normal      Thought Process/Content: Logical      Affective Functioning: Congruent      Mood: calm      Level of consciousness:  Alert and Oriented x4      Response to Learning: Able to verbalize current knowledge/experience, Able to verbalize/acknowledge new learning, Able to retain information, Capable of insight, and Progressing to goal      Endings: None Reported    Modes of Intervention: Support, Socialization, and Exploration      Discipline Responsible: /Counselor      Signature:  SANDRITA Garcia

## 2024-06-13 NOTE — GROUP NOTE
Group Therapy Note    Date: 6/13/2024    Group Start Time:  1:10 PM  Group End Time:  2:00 PM  Group Topic: Psychoeducation    [facility]    Joseline Unger MSW        Group Therapy Note  This writer facilitated a psycho-educational group focusing on the seven inner critics. Each patient was provided with a handout to give an overview and provide insight into who the seven critics are. Each patient was asked to identify the critics they find themselves to relate to the most. Each patient was provided with handouts to provide insight into how to understand and challenge their inner critics. After allowing time for this, this facilitator encouraged patients to discuss their thoughts, feelings, and reflections following the activity.     Attendees: 7       Patient's Goal:  To provide an overview and identify the 7 inner critics    Notes: PT was present and participated in the PHP group. Pt volunteered to share their response and listened to the support given to them by their peers. Pt completed the handout and followed along with the prompts on the handout. PT expressed that there are several inner critics that have influenced her behavior. Pt identified these critics as the underminers and the destroyers. PT identified that the underminer gives her the most trouble because it attacks her self-worth. PT provided active listening for peers as they shared their responses to the question.     Status After Intervention:  Improved    Participation Level: Active Listener and Interactive    Participation Quality: Appropriate, Sharing, and Supportive      Speech:  normal      Thought Process/Content: Logical      Affective Functioning: Congruent      Mood: calm      Level of consciousness:  Alert and Oriented x4      Response to Learning: Able to verbalize current knowledge/experience, Able to verbalize/acknowledge new learning, Able to retain information,

## 2024-06-14 ENCOUNTER — APPOINTMENT (OUTPATIENT)
Facility: HOSPITAL | Age: 24
End: 2024-06-14
Payer: OTHER GOVERNMENT

## 2024-06-14 NOTE — BH NOTE
Discharge Follow up Call    This writer called the patient at 11:05 a.m. for her discharge follow-up call. This writer inquired about the patient's current mental status. The patient informed this writer that she is feeling good mentally and just picked up her medications. Pt informed this writer that she would be heading over to [place] later this afternoon for her follow-up appointment at 1 p.m. Pt inquired about the process for a second referral so she can return to PHP once she comes back from leave. This writer informed the pt she would reach out to Mile ( at [place]) to gain insight on the process and what steps need to be completed from the writer's side. This writer informed the patient that she would give her a call back with updates once she received that information. This writer used tracking questions to gain insight on if she missed anything or if he had anything of importance to tell her or discuss. The PT reported that she did not have anything at this time that she could think of but would report back to this writer if anything popped up.

## 2024-06-17 ENCOUNTER — APPOINTMENT (OUTPATIENT)
Facility: HOSPITAL | Age: 24
End: 2024-06-17
Payer: OTHER GOVERNMENT

## 2024-06-18 ENCOUNTER — APPOINTMENT (OUTPATIENT)
Facility: HOSPITAL | Age: 24
End: 2024-06-18
Payer: OTHER GOVERNMENT

## 2024-06-19 ENCOUNTER — APPOINTMENT (OUTPATIENT)
Facility: HOSPITAL | Age: 24
End: 2024-06-19
Payer: OTHER GOVERNMENT

## 2024-06-20 ENCOUNTER — APPOINTMENT (OUTPATIENT)
Facility: HOSPITAL | Age: 24
End: 2024-06-20
Payer: OTHER GOVERNMENT

## 2024-06-21 ENCOUNTER — APPOINTMENT (OUTPATIENT)
Facility: HOSPITAL | Age: 24
End: 2024-06-21
Payer: OTHER GOVERNMENT

## 2024-06-24 ENCOUNTER — APPOINTMENT (OUTPATIENT)
Facility: HOSPITAL | Age: 24
End: 2024-06-24
Payer: OTHER GOVERNMENT

## 2024-06-25 ENCOUNTER — APPOINTMENT (OUTPATIENT)
Facility: HOSPITAL | Age: 24
End: 2024-06-25
Payer: OTHER GOVERNMENT

## 2024-06-26 ENCOUNTER — APPOINTMENT (OUTPATIENT)
Facility: HOSPITAL | Age: 24
End: 2024-06-26
Payer: OTHER GOVERNMENT

## 2024-06-27 ENCOUNTER — APPOINTMENT (OUTPATIENT)
Facility: HOSPITAL | Age: 24
End: 2024-06-27
Payer: OTHER GOVERNMENT

## 2024-06-28 ENCOUNTER — APPOINTMENT (OUTPATIENT)
Facility: HOSPITAL | Age: 24
End: 2024-06-28
Payer: OTHER GOVERNMENT

## 2024-09-17 NOTE — BH NOTE
PSYCHIATRIC DISCHARGE SUMMARY         IDENTIFICATION:    Patient Name  Michelle Sweeney   Date of Birth 2000   [facility] [id number]   Medical Record Number  [id number]      Age  23 y.o.   PCP No primary care provider on file.   Admit date:  5/29/2024    Discharge date: 5/30/2024   Room Number  244/02  @ [facility]   Date of Service  5/30/2024            TYPE OF DISCHARGE: REGULAR               CONDITION AT DISCHARGE: Stable       PROVISIONAL & DISCHARGE DIAGNOSES:      MDD, single episode        CC & HISTORY OF PRESENT ILLNESS:   Depression, si    HISTORY OF PRESENT ILLNESS:      The patient, Michelle Sweeney, is a 23 y.o. female admitted to the behavioral health unit for depression.  Patient initially voluntarily presented to the emergency room with depression.  She is an [employer] with a behavioral health appointment in July, but could not wait that long.  Psychiatry consult was done as patient stated that she reports suicidal thoughts feeling hopeless and that she has no purpose during initial evaluation.     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 States that her work and people around her sometimes has not been the best.  She states that her biggest support was her mother, but that her mother passed away in 2021.  Since she is the oldest sibling, she stated that she feels like she need to remain strong and that she did not have time to grieve.  Patient had an overall flat affect during the interview.  Endorses feeling sadness and trouble sleeping.  Patient has been isolating herself, with disturbances in her sleep and appetite.  States that she also has trouble with fidgeting.  Endorses racing thoughts.  She currently does not state any suicidal or homicidal ideations today at time of our evaluation.  She denies any auditory or visual hallucinations.      Patient this morning requested to leave as she states that she does not feel comfortable staying here.  D19 process was  [Chaperone Present] : A chaperone was present in the examining room during all aspects of the physical examination [Appropriately responsive] : appropriately responsive [Alert] : alert [No Acute Distress] : no acute distress [Oriented x3] : oriented x3

## 2024-11-30 ENCOUNTER — HOSPITAL ENCOUNTER (EMERGENCY)
Facility: HOSPITAL | Age: 24
Discharge: HOME | End: 2024-11-30
Payer: OTHER GOVERNMENT

## 2024-11-30 VITALS
TEMPERATURE: 98 F | RESPIRATION RATE: 18 BRPM | BODY MASS INDEX: 23.85 KG/M2 | HEART RATE: 92 BPM | OXYGEN SATURATION: 99 % | DIASTOLIC BLOOD PRESSURE: 95 MMHG | WEIGHT: 161.6 LBS | SYSTOLIC BLOOD PRESSURE: 132 MMHG

## 2024-11-30 DIAGNOSIS — F41.1 ANXIETY STATE: ICD-10-CM

## 2024-11-30 DIAGNOSIS — Z32.01 PREGNANCY TEST PERFORMED, PREGNANCY CONFIRMED: Primary | ICD-10-CM

## 2024-11-30 LAB
APPEARANCE UR: ABNORMAL
BACTERIA URNS QL MICRO: NEGATIVE /HPF
BILIRUB UR QL: NEGATIVE
CLUE CELLS VAG QL WET PREP: NORMAL
COLOR UR: YELLOW
EPITH CASTS URNS QL MICRO: ABNORMAL /LPF
GLUCOSE UR STRIP.AUTO-MCNC: NEGATIVE MG/DL
HCG UR QL: POSITIVE
HGB UR QL STRIP: NEGATIVE
KETONES UR QL STRIP.AUTO: NEGATIVE MG/DL
LEUKOCYTE ESTERASE UR QL STRIP.AUTO: NEGATIVE
NITRITE UR QL STRIP.AUTO: NEGATIVE
PH UR STRIP: 6 (ref 5–8)
PROT UR STRIP-MCNC: ABNORMAL MG/DL
RBC #/AREA URNS HPF: ABNORMAL /HPF (ref 0–5)
SP GR UR REFRACTOMETRY: 1.02 (ref 1–1.03)
T VAGINALIS VAG QL WET PREP: NORMAL
URINE CULTURE IF INDICATED: ABNORMAL
UROBILINOGEN UR QL STRIP.AUTO: 1 EU/DL (ref 0.2–1)
WBC URNS QL MICRO: ABNORMAL /HPF (ref 0–4)
YEAST: NORMAL

## 2024-11-30 PROCEDURE — 81001 URINALYSIS AUTO W/SCOPE: CPT

## 2024-11-30 PROCEDURE — 99283 EMERGENCY DEPT VISIT LOW MDM: CPT

## 2024-11-30 PROCEDURE — 87591 N.GONORRHOEAE DNA AMP PROB: CPT

## 2024-11-30 PROCEDURE — 87491 CHLMYD TRACH DNA AMP PROBE: CPT

## 2024-11-30 PROCEDURE — 87210 SMEAR WET MOUNT SALINE/INK: CPT

## 2024-11-30 PROCEDURE — 81025 URINE PREGNANCY TEST: CPT

## 2024-11-30 RX ORDER — ACETAMINOPHEN 500 MG
1000 TABLET ORAL 4 TIMES DAILY PRN
Qty: 40 TABLET | Refills: 0 | Status: SHIPPED | OUTPATIENT
Start: 2024-11-30

## 2024-11-30 NOTE — ED PROVIDER NOTES
[facility] EMERGENCY DEPT  EMERGENCY DEPARTMENT HISTORY AND PHYSICAL EXAM      Date: 2024  Patient Name: Michelle Sweeney  MRN: [id number]  YOB: 2000  Date of evaluation: 2024  Provider: Jesus Irwin PA-C   Note Started: 1:15 PM EST 24    HISTORY OF PRESENT ILLNESS     Chief Complaint   Patient presents with    Pregnancy Test       History Provided By: Patient    HPI: Michelle Sweeney is a 24 y.o. female, , with past medical history as listed below presents this ED requesting pregnancy testing.  Patient reports that her home pregnancy test and comes in today requesting confirmation.  Reports LMP 2024.  Patient does have complaints of urinary frequency.  Specifically denies any associated abdominal pain, flank pain, pelvic pain, dysuria, hematuria, vaginal bleeding, vaginal discharge, rashes.  No nausea or vomiting.  Patient does additionally note that she tested positive for BV 2 weeks ago and is also requesting to be retested.  Has since finished course of metronidazole with symptomatic resolution.  Reports otherwise being well has no further concerns.    PAST MEDICAL HISTORY   Past Medical History:  Past Medical History:   Diagnosis Date    Anxiety     Asthma        Past Surgical History:  No past surgical history on file.    Family History:  No family history on file.    Social History:  Social History     Tobacco Use    Smoking status: Never    Smokeless tobacco: Never   Substance Use Topics    Alcohol use: Yes    Drug use: Never       Allergies:  No Known Allergies    PCP: Jennifer Rincon PA    Current Meds:   No current facility-administered medications for this encounter.     Current Outpatient Medications   Medication Sig Dispense Refill    acetaminophen (TYLENOL) 500 MG tablet Take 2 tablets by mouth 4 times daily as needed for Pain 40 tablet 0    sertraline (ZOLOFT) 25 MG tablet Take 1 tablet by mouth daily 15 tablet 0    traZODone (DESYREL) 50 MG tablet Take 1 tablet by mouth  that have escaped final proofreading.)     Jesus Irwin PA-C  11/30/24 [id number]

## 2024-11-30 NOTE — ED TRIAGE NOTES
Patient ambulatory into ED to confirm pregnancy. LMP Nov 1st. Patient reports positive pregnancy test at home. Denies pain, cramping, or bleeding.

## 2024-12-02 LAB
C TRACH DNA SPEC QL NAA+PROBE: NEGATIVE
N GONORRHOEA DNA SPEC QL NAA+PROBE: NEGATIVE
SAMPLE TYPE: NORMAL
SERVICE CMNT-IMP: NORMAL
SPECIMEN SOURCE: NORMAL

## 2025-07-10 ENCOUNTER — HOSPITAL ENCOUNTER (EMERGENCY)
Facility: HOSPITAL | Age: 25
Discharge: HOME | End: 2025-07-10
Attending: FAMILY MEDICINE
Payer: OTHER GOVERNMENT

## 2025-07-10 VITALS
BODY MASS INDEX: 25.77 KG/M2 | RESPIRATION RATE: 16 BRPM | HEIGHT: 69 IN | SYSTOLIC BLOOD PRESSURE: 130 MMHG | HEART RATE: 78 BPM | WEIGHT: 174 LBS | OXYGEN SATURATION: 97 % | DIASTOLIC BLOOD PRESSURE: 89 MMHG | TEMPERATURE: 98.6 F

## 2025-07-10 DIAGNOSIS — H92.03 OTALGIA OF BOTH EARS: ICD-10-CM

## 2025-07-10 DIAGNOSIS — M79.10 MYALGIA: Primary | ICD-10-CM

## 2025-07-10 DIAGNOSIS — J06.9 URI WITH COUGH AND CONGESTION: ICD-10-CM

## 2025-07-10 LAB
FLUAV RNA SPEC QL NAA+PROBE: NOT DETECTED
FLUBV RNA SPEC QL NAA+PROBE: NOT DETECTED
SARS-COV-2 RNA RESP QL NAA+PROBE: NOT DETECTED

## 2025-07-10 PROCEDURE — 6370000000 HC RX 637 (ALT 250 FOR IP): Performed by: FAMILY MEDICINE

## 2025-07-10 PROCEDURE — 99283 EMERGENCY DEPT VISIT LOW MDM: CPT

## 2025-07-10 PROCEDURE — 87636 SARSCOV2 & INF A&B AMP PRB: CPT

## 2025-07-10 RX ORDER — PREDNISONE 20 MG/1
40 TABLET ORAL DAILY
Qty: 10 TABLET | Refills: 0 | Status: SHIPPED | OUTPATIENT
Start: 2025-07-10 | End: 2025-07-15

## 2025-07-10 RX ORDER — ACETAMINOPHEN 500 MG
1000 TABLET ORAL
Status: COMPLETED | OUTPATIENT
Start: 2025-07-10 | End: 2025-07-10

## 2025-07-10 RX ORDER — NAPROXEN 500 MG/1
500 TABLET ORAL 2 TIMES DAILY
Qty: 12 TABLET | Refills: 0 | Status: SHIPPED | OUTPATIENT
Start: 2025-07-10 | End: 2025-07-16

## 2025-07-10 RX ORDER — BENZONATATE 100 MG/1
100 CAPSULE ORAL 3 TIMES DAILY PRN
Qty: 15 CAPSULE | Refills: 0 | Status: SHIPPED | OUTPATIENT
Start: 2025-07-10 | End: 2025-07-17

## 2025-07-10 RX ADMIN — ACETAMINOPHEN 1000 MG: 500 TABLET ORAL at 17:55

## 2025-07-10 ASSESSMENT — PAIN DESCRIPTION - LOCATION: LOCATION: GENERALIZED

## 2025-07-10 ASSESSMENT — PAIN - FUNCTIONAL ASSESSMENT: PAIN_FUNCTIONAL_ASSESSMENT: 0-10

## 2025-07-10 ASSESSMENT — LIFESTYLE VARIABLES
HOW MANY STANDARD DRINKS CONTAINING ALCOHOL DO YOU HAVE ON A TYPICAL DAY: PATIENT DOES NOT DRINK
HOW OFTEN DO YOU HAVE A DRINK CONTAINING ALCOHOL: NEVER

## 2025-07-10 ASSESSMENT — PAIN SCALES - GENERAL: PAINLEVEL_OUTOF10: 6

## 2025-07-10 NOTE — DISCHARGE INSTRUCTIONS
Thank you for choosing our Emergency Department for your care.  It is our privilege to care for you in your time of need.  In the next several days, you may receive a survey via email or mailed to your home about your experience with our team.  We would greatly appreciate you taking a few minutes to complete the survey, as we use this information to learn what we have done well and what we could be doing better. Thank you for trusting us with your care!    Below you will find a list of your tests from today's visit.   Labs and Radiology Studies  Recent Results (from the past 12 hours)   COVID-19 & Influenza Combo    Collection Time: 07/10/25  5:51 PM    Specimen: Nasopharyngeal   Result Value Ref Range    SARS-CoV-2, PCR Not Detected Not Detected      Rapid Influenza A By PCR Not Detected Not Detected      Rapid Influenza B By PCR Not Detected Not Detected       No results found.  ------------------------------------------------------------------------------------------------------------  The evaluation and treatment you received in the Emergency Department were for an urgent problem. It is important that you follow-up with a doctor, nurse practitioner, or physician assistant to:  (1) confirm your diagnosis,  (2) re-evaluation of changes in your illness and treatment, and (3) for ongoing care. Please take your discharge instructions with you when you go to your follow-up appointment.     If you have any problem arranging a follow-up appointment, contact us!  If your symptoms become worse or you do not improve as expected, please return to us. We are available 24 hours a day.     If a prescription has been provided, please fill it as soon as possible to prevent a delay in treatment. If you have any questions or reservations about taking the medication due to side effects or interactions with other medications, please call your primary care provider or contact us directly.  Again, THANK YOU for choosing us to care

## 2025-07-10 NOTE — ED TRIAGE NOTES
Patient endorse flu like symptoms. Cough, runny nose, body aches, headache, chills.    Symptoms began 2 days ago

## 2025-07-12 NOTE — ED PROVIDER NOTES
EMERGENCY DEPARTMENT HISTORY AND PHYSICAL EXAM      Date: 7/10/2025  Patient Name: Michelle Sweeney    History of Presenting Illness     Chief Complaint   Patient presents with    Cough    Headache    Generalized Body Aches    Nasal Congestion       HPI: Michelle Sweeney, is a very pleasant 25 y.o. female presenting to the ED with a CC of cough, congestion, body aches.  Also having ear pain.  No drainage.  No bony tenderness around the ear  Recent onset of symptoms.  No fevers nor changes in oral intake.  No difficulty breathing.  No alleviating or aggravating factors.         Past History     Past Medical History:  Past Medical History:   Diagnosis Date    Anxiety     Asthma        Past Surgical History:  History reviewed. No pertinent surgical history.    Family History:  History reviewed. No pertinent family history.    Social History:  Social History     Tobacco Use    Smoking status: Never    Smokeless tobacco: Never   Substance Use Topics    Alcohol use: Yes    Drug use: Never       Allergies:  No Known Allergies    PCP: Jennifer Rincon PA    Current Meds:   No current facility-administered medications for this encounter.     Current Outpatient Medications   Medication Sig Dispense Refill    predniSONE (DELTASONE) 20 MG tablet Take 2 tablets by mouth daily for 5 days 10 tablet 0    benzonatate (TESSALON PERLES) 100 MG capsule Take 1 capsule by mouth 3 times daily as needed for Cough 15 capsule 0    naproxen (NAPROSYN) 500 MG tablet Take 1 tablet by mouth 2 times daily for 6 days 12 tablet 0    sertraline (ZOLOFT) 25 MG tablet Take 1 tablet by mouth daily 15 tablet 0    acetaminophen (TYLENOL) 500 MG tablet Take 2 tablets by mouth 4 times daily as needed for Pain (Patient not taking: Reported on 7/10/2025) 40 tablet 0    traZODone (DESYREL) 50 MG tablet Take 1 tablet by mouth nightly as needed for Sleep 15 tablet 0       Social Determinants of Health:   Social Drivers of Health     Tobacco Use: Low Risk